# Patient Record
Sex: MALE | Race: WHITE | Employment: FULL TIME
[De-identification: names, ages, dates, MRNs, and addresses within clinical notes are randomized per-mention and may not be internally consistent; named-entity substitution may affect disease eponyms.]

---

## 2017-02-15 ENCOUNTER — OFFICE VISIT (OUTPATIENT)
Dept: FAMILY MEDICINE CLINIC | Facility: CLINIC | Age: 32
End: 2017-02-15

## 2017-02-15 VITALS
HEIGHT: 68 IN | DIASTOLIC BLOOD PRESSURE: 74 MMHG | RESPIRATION RATE: 18 BRPM | SYSTOLIC BLOOD PRESSURE: 122 MMHG | WEIGHT: 184.97 LBS | OXYGEN SATURATION: 100 % | TEMPERATURE: 98 F | HEART RATE: 76 BPM | BODY MASS INDEX: 28.03 KG/M2

## 2017-02-15 DIAGNOSIS — J02.9 SORE THROAT: ICD-10-CM

## 2017-02-15 DIAGNOSIS — J00 ACUTE NASOPHARYNGITIS (COMMON COLD): Primary | ICD-10-CM

## 2017-02-15 LAB — S PYO AG THROAT QL: NORMAL

## 2017-02-15 PROCEDURE — 87880 STREP A ASSAY W/OPTIC: CPT | Performed by: FAMILY MEDICINE

## 2017-02-15 PROCEDURE — G8427 DOCREV CUR MEDS BY ELIG CLIN: HCPCS | Performed by: FAMILY MEDICINE

## 2017-02-15 PROCEDURE — G8419 CALC BMI OUT NRM PARAM NOF/U: HCPCS | Performed by: FAMILY MEDICINE

## 2017-02-15 PROCEDURE — G8484 FLU IMMUNIZE NO ADMIN: HCPCS | Performed by: FAMILY MEDICINE

## 2017-02-15 PROCEDURE — 4004F PT TOBACCO SCREEN RCVD TLK: CPT | Performed by: FAMILY MEDICINE

## 2017-02-15 PROCEDURE — 99213 OFFICE O/P EST LOW 20 MIN: CPT | Performed by: FAMILY MEDICINE

## 2017-02-15 RX ORDER — FLUTICASONE PROPIONATE 50 MCG
2 SPRAY, SUSPENSION (ML) NASAL DAILY
Qty: 1 BOTTLE | Refills: 0 | Status: SHIPPED | OUTPATIENT
Start: 2017-02-15 | End: 2018-07-11 | Stop reason: ALTCHOICE

## 2017-02-15 ASSESSMENT — ENCOUNTER SYMPTOMS
WHEEZING: 0
COUGH: 0
EYE REDNESS: 0
RHINORRHEA: 1
SHORTNESS OF BREATH: 0
EYE DISCHARGE: 0
SORE THROAT: 1
CHEST TIGHTNESS: 0
SINUS PRESSURE: 0

## 2017-03-15 ENCOUNTER — OFFICE VISIT (OUTPATIENT)
Dept: FAMILY MEDICINE CLINIC | Age: 32
End: 2017-03-15
Payer: COMMERCIAL

## 2017-03-15 VITALS
RESPIRATION RATE: 18 BRPM | DIASTOLIC BLOOD PRESSURE: 75 MMHG | WEIGHT: 184.97 LBS | HEART RATE: 95 BPM | OXYGEN SATURATION: 97 % | TEMPERATURE: 96.4 F | HEIGHT: 68 IN | BODY MASS INDEX: 28.03 KG/M2 | SYSTOLIC BLOOD PRESSURE: 113 MMHG

## 2017-03-15 DIAGNOSIS — J02.9 PHARYNGITIS, UNSPECIFIED ETIOLOGY: ICD-10-CM

## 2017-03-15 DIAGNOSIS — J01.10 ACUTE NON-RECURRENT FRONTAL SINUSITIS: Primary | ICD-10-CM

## 2017-03-15 LAB — S PYO AG THROAT QL: NORMAL

## 2017-03-15 PROCEDURE — G8427 DOCREV CUR MEDS BY ELIG CLIN: HCPCS | Performed by: FAMILY MEDICINE

## 2017-03-15 PROCEDURE — 4004F PT TOBACCO SCREEN RCVD TLK: CPT | Performed by: FAMILY MEDICINE

## 2017-03-15 PROCEDURE — G8419 CALC BMI OUT NRM PARAM NOF/U: HCPCS | Performed by: FAMILY MEDICINE

## 2017-03-15 PROCEDURE — 87880 STREP A ASSAY W/OPTIC: CPT | Performed by: FAMILY MEDICINE

## 2017-03-15 PROCEDURE — G8484 FLU IMMUNIZE NO ADMIN: HCPCS | Performed by: FAMILY MEDICINE

## 2017-03-15 PROCEDURE — 99213 OFFICE O/P EST LOW 20 MIN: CPT | Performed by: FAMILY MEDICINE

## 2017-03-15 RX ORDER — GUAIFENESIN AND PSEUDOEPHEDRINE HCL 1200; 120 MG/1; MG/1
120 TABLET, EXTENDED RELEASE ORAL 2 TIMES DAILY
Qty: 20 TABLET | Refills: 0 | Status: SHIPPED | OUTPATIENT
Start: 2017-03-15 | End: 2017-03-25

## 2017-03-15 RX ORDER — FLUTICASONE PROPIONATE 50 MCG
1 SPRAY, SUSPENSION (ML) NASAL 2 TIMES DAILY
Qty: 1 BOTTLE | Refills: 2 | Status: SHIPPED | OUTPATIENT
Start: 2017-03-15 | End: 2018-07-11 | Stop reason: ALTCHOICE

## 2017-03-15 RX ORDER — AMOXICILLIN 875 MG/1
875 TABLET, COATED ORAL 2 TIMES DAILY
Qty: 20 TABLET | Refills: 0 | Status: SHIPPED | OUTPATIENT
Start: 2017-03-15 | End: 2017-03-25

## 2017-03-15 ASSESSMENT — ENCOUNTER SYMPTOMS
SORE THROAT: 1
RHINORRHEA: 1
COUGH: 1
EYES NEGATIVE: 1
SINUS PRESSURE: 1
ALLERGIC/IMMUNOLOGIC NEGATIVE: 1
GASTROINTESTINAL NEGATIVE: 1

## 2017-03-21 ENCOUNTER — OFFICE VISIT (OUTPATIENT)
Dept: FAMILY MEDICINE CLINIC | Age: 32
End: 2017-03-21
Payer: COMMERCIAL

## 2017-03-21 VITALS
TEMPERATURE: 98.1 F | HEART RATE: 74 BPM | RESPIRATION RATE: 18 BRPM | WEIGHT: 174 LBS | BODY MASS INDEX: 27.31 KG/M2 | OXYGEN SATURATION: 99 % | DIASTOLIC BLOOD PRESSURE: 74 MMHG | HEIGHT: 67 IN | SYSTOLIC BLOOD PRESSURE: 119 MMHG

## 2017-03-21 DIAGNOSIS — J02.9 PHARYNGITIS, UNSPECIFIED ETIOLOGY: ICD-10-CM

## 2017-03-21 DIAGNOSIS — H66.92 LEFT OTITIS MEDIA, UNSPECIFIED CHRONICITY, UNSPECIFIED OTITIS MEDIA TYPE: ICD-10-CM

## 2017-03-21 DIAGNOSIS — J01.90 ACUTE SINUSITIS, RECURRENCE NOT SPECIFIED, UNSPECIFIED LOCATION: Primary | ICD-10-CM

## 2017-03-21 PROCEDURE — G8419 CALC BMI OUT NRM PARAM NOF/U: HCPCS | Performed by: FAMILY MEDICINE

## 2017-03-21 PROCEDURE — 4004F PT TOBACCO SCREEN RCVD TLK: CPT | Performed by: FAMILY MEDICINE

## 2017-03-21 PROCEDURE — 99213 OFFICE O/P EST LOW 20 MIN: CPT | Performed by: FAMILY MEDICINE

## 2017-03-21 PROCEDURE — G8484 FLU IMMUNIZE NO ADMIN: HCPCS | Performed by: FAMILY MEDICINE

## 2017-03-21 PROCEDURE — G8427 DOCREV CUR MEDS BY ELIG CLIN: HCPCS | Performed by: FAMILY MEDICINE

## 2017-03-21 RX ORDER — PREDNISONE 20 MG/1
20 TABLET ORAL 2 TIMES DAILY
Qty: 6 TABLET | Refills: 0 | Status: SHIPPED | OUTPATIENT
Start: 2017-03-21 | End: 2017-03-24

## 2017-03-21 RX ORDER — SULFAMETHOXAZOLE AND TRIMETHOPRIM 800; 160 MG/1; MG/1
1 TABLET ORAL 2 TIMES DAILY
Qty: 20 TABLET | Refills: 0 | Status: SHIPPED | OUTPATIENT
Start: 2017-03-21 | End: 2017-03-31

## 2017-03-21 ASSESSMENT — ENCOUNTER SYMPTOMS
EYE ITCHING: 0
VOMITING: 0
SINUS PRESSURE: 1
EYE REDNESS: 0
SHORTNESS OF BREATH: 0
RHINORRHEA: 1
SWOLLEN GLANDS: 1
COUGH: 1
EYE DISCHARGE: 0
TROUBLE SWALLOWING: 0
WHEEZING: 0
CONSTIPATION: 0
ABDOMINAL PAIN: 0
VOICE CHANGE: 0
NAUSEA: 0
BACK PAIN: 0
DIARRHEA: 1
SORE THROAT: 1
CHEST TIGHTNESS: 0

## 2017-04-10 ENCOUNTER — OFFICE VISIT (OUTPATIENT)
Dept: FAMILY MEDICINE CLINIC | Age: 32
End: 2017-04-10
Payer: COMMERCIAL

## 2017-04-10 VITALS
HEIGHT: 67 IN | DIASTOLIC BLOOD PRESSURE: 76 MMHG | OXYGEN SATURATION: 100 % | RESPIRATION RATE: 18 BRPM | SYSTOLIC BLOOD PRESSURE: 121 MMHG | HEART RATE: 75 BPM | WEIGHT: 176 LBS | TEMPERATURE: 98 F | BODY MASS INDEX: 27.62 KG/M2

## 2017-04-10 DIAGNOSIS — M75.52 SHOULDER BURSITIS, LEFT: Primary | ICD-10-CM

## 2017-04-10 DIAGNOSIS — M25.512 ACUTE PAIN OF LEFT SHOULDER: ICD-10-CM

## 2017-04-10 PROCEDURE — 99213 OFFICE O/P EST LOW 20 MIN: CPT | Performed by: FAMILY MEDICINE

## 2017-04-10 PROCEDURE — 4004F PT TOBACCO SCREEN RCVD TLK: CPT | Performed by: FAMILY MEDICINE

## 2017-04-10 PROCEDURE — G8427 DOCREV CUR MEDS BY ELIG CLIN: HCPCS | Performed by: FAMILY MEDICINE

## 2017-04-10 PROCEDURE — G8419 CALC BMI OUT NRM PARAM NOF/U: HCPCS | Performed by: FAMILY MEDICINE

## 2017-04-10 RX ORDER — IBUPROFEN AND FAMOTIDINE 26.6; 8 MG/1; MG/1
1 TABLET, FILM COATED ORAL 3 TIMES DAILY
Qty: 90 TABLET | Refills: 1 | Status: SHIPPED | OUTPATIENT
Start: 2017-04-10 | End: 2017-08-13 | Stop reason: ALTCHOICE

## 2017-04-10 RX ORDER — CYCLOBENZAPRINE HCL 10 MG
10 TABLET ORAL 2 TIMES DAILY
Qty: 20 TABLET | Refills: 0 | Status: SHIPPED | OUTPATIENT
Start: 2017-04-10 | End: 2017-04-20

## 2017-04-10 ASSESSMENT — ENCOUNTER SYMPTOMS
GASTROINTESTINAL NEGATIVE: 1
ALLERGIC/IMMUNOLOGIC NEGATIVE: 1
EYES NEGATIVE: 1
BACK PAIN: 1
RESPIRATORY NEGATIVE: 1

## 2017-04-18 ENCOUNTER — OFFICE VISIT (OUTPATIENT)
Dept: FAMILY MEDICINE CLINIC | Age: 32
End: 2017-04-18
Payer: COMMERCIAL

## 2017-04-18 VITALS
HEART RATE: 90 BPM | TEMPERATURE: 98.3 F | RESPIRATION RATE: 18 BRPM | HEIGHT: 68 IN | SYSTOLIC BLOOD PRESSURE: 118 MMHG | WEIGHT: 173 LBS | DIASTOLIC BLOOD PRESSURE: 68 MMHG | BODY MASS INDEX: 26.22 KG/M2 | OXYGEN SATURATION: 100 %

## 2017-04-18 DIAGNOSIS — R06.2 WHEEZING: ICD-10-CM

## 2017-04-18 DIAGNOSIS — J20.9 ACUTE BRONCHITIS, UNSPECIFIED ORGANISM: Primary | ICD-10-CM

## 2017-04-18 DIAGNOSIS — J06.9 UPPER RESPIRATORY TRACT INFECTION, UNSPECIFIED TYPE: ICD-10-CM

## 2017-04-18 LAB — S PYO AG THROAT QL: NORMAL

## 2017-04-18 PROCEDURE — 87880 STREP A ASSAY W/OPTIC: CPT | Performed by: FAMILY MEDICINE

## 2017-04-18 PROCEDURE — 4004F PT TOBACCO SCREEN RCVD TLK: CPT | Performed by: FAMILY MEDICINE

## 2017-04-18 PROCEDURE — 94640 AIRWAY INHALATION TREATMENT: CPT | Performed by: FAMILY MEDICINE

## 2017-04-18 PROCEDURE — 99213 OFFICE O/P EST LOW 20 MIN: CPT | Performed by: FAMILY MEDICINE

## 2017-04-18 PROCEDURE — G8419 CALC BMI OUT NRM PARAM NOF/U: HCPCS | Performed by: FAMILY MEDICINE

## 2017-04-18 PROCEDURE — G8427 DOCREV CUR MEDS BY ELIG CLIN: HCPCS | Performed by: FAMILY MEDICINE

## 2017-04-18 RX ORDER — BENZONATATE 100 MG/1
100 CAPSULE ORAL 3 TIMES DAILY PRN
Qty: 25 CAPSULE | Refills: 0 | Status: SHIPPED | OUTPATIENT
Start: 2017-04-18 | End: 2017-05-13

## 2017-04-18 RX ORDER — AZITHROMYCIN 250 MG/1
250 TABLET, FILM COATED ORAL DAILY
Qty: 6 TABLET | Refills: 0 | Status: SHIPPED | OUTPATIENT
Start: 2017-04-18 | End: 2017-04-23

## 2017-04-18 RX ORDER — ALBUTEROL SULFATE 90 UG/1
2 AEROSOL, METERED RESPIRATORY (INHALATION) EVERY 6 HOURS PRN
Qty: 1 INHALER | Refills: 0 | Status: SHIPPED | OUTPATIENT
Start: 2017-04-18 | End: 2017-08-13 | Stop reason: ALTCHOICE

## 2017-04-18 RX ORDER — ALBUTEROL SULFATE 2.5 MG/3ML
2.5 SOLUTION RESPIRATORY (INHALATION) ONCE
Status: COMPLETED | OUTPATIENT
Start: 2017-04-18 | End: 2017-04-18

## 2017-04-18 RX ORDER — PREDNISONE 20 MG/1
20 TABLET ORAL 2 TIMES DAILY
Qty: 10 TABLET | Refills: 0 | Status: SHIPPED | OUTPATIENT
Start: 2017-04-18 | End: 2017-04-23

## 2017-04-18 RX ADMIN — ALBUTEROL SULFATE 2.5 MG: 2.5 SOLUTION RESPIRATORY (INHALATION) at 17:39

## 2017-04-18 ASSESSMENT — ENCOUNTER SYMPTOMS
SHORTNESS OF BREATH: 0
VOICE CHANGE: 0
EYE DISCHARGE: 0
WHEEZING: 1
RHINORRHEA: 1
EYE REDNESS: 0
BACK PAIN: 0
CONSTIPATION: 0
VOMITING: 0
EYE ITCHING: 0
NAUSEA: 0
SINUS PRESSURE: 0
CHEST TIGHTNESS: 0
TROUBLE SWALLOWING: 1
SORE THROAT: 1
DIARRHEA: 0
SWOLLEN GLANDS: 1
CHANGE IN BOWEL HABIT: 0
ABDOMINAL PAIN: 1
COUGH: 1

## 2017-04-19 ENCOUNTER — TELEPHONE (OUTPATIENT)
Dept: FAMILY MEDICINE CLINIC | Age: 32
End: 2017-04-19

## 2017-04-19 ENCOUNTER — HOSPITAL ENCOUNTER (OUTPATIENT)
Age: 32
Discharge: HOME | End: 2017-04-19
Payer: COMMERCIAL

## 2017-04-19 DIAGNOSIS — M54.6 MIDLINE THORACIC BACK PAIN, UNSPECIFIED CHRONICITY: ICD-10-CM

## 2017-04-19 DIAGNOSIS — J20.9 ACUTE BRONCHITIS, UNSPECIFIED ORGANISM: ICD-10-CM

## 2017-04-19 LAB
ABSOLUTE EOS #: 0 K/UL (ref 0–0.4)
ABSOLUTE LYMPH #: 1.8 K/UL (ref 1–4.8)
ABSOLUTE MONO #: 0.9 K/UL (ref 0.1–1.3)
ANION GAP SERPL CALCULATED.3IONS-SCNC: 13 MMOL/L (ref 9–17)
BASOPHILS # BLD: 0 % (ref 0–2)
BASOPHILS ABSOLUTE: 0 K/UL (ref 0–0.2)
BUN BLDV-MCNC: 12 MG/DL (ref 6–20)
BUN/CREAT BLD: ABNORMAL (ref 9–20)
CALCIUM SERPL-MCNC: 9.1 MG/DL (ref 8.6–10.4)
CHLORIDE BLD-SCNC: 101 MMOL/L (ref 98–107)
CO2: 26 MMOL/L (ref 20–31)
CREAT SERPL-MCNC: 0.69 MG/DL (ref 0.7–1.2)
DIFFERENTIAL TYPE: ABNORMAL
EOSINOPHILS RELATIVE PERCENT: 0 % (ref 0–4)
GFR AFRICAN AMERICAN: >60 ML/MIN
GFR NON-AFRICAN AMERICAN: >60 ML/MIN
GFR SERPL CREATININE-BSD FRML MDRD: ABNORMAL ML/MIN/{1.73_M2}
GFR SERPL CREATININE-BSD FRML MDRD: ABNORMAL ML/MIN/{1.73_M2}
GLUCOSE BLD-MCNC: 100 MG/DL (ref 70–99)
HCT VFR BLD CALC: 42 % (ref 41–53)
HEMOGLOBIN: 14.2 G/DL (ref 13.5–17.5)
LYMPHOCYTES # BLD: 16 % (ref 24–44)
MCH RBC QN AUTO: 29.6 PG (ref 26–34)
MCHC RBC AUTO-ENTMCNC: 33.8 G/DL (ref 31–37)
MCV RBC AUTO: 87.8 FL (ref 80–100)
MONOCYTES # BLD: 8 % (ref 1–7)
PDW BLD-RTO: 13.6 % (ref 11.5–14.9)
PLATELET # BLD: 196 K/UL (ref 150–450)
PLATELET ESTIMATE: ABNORMAL
PMV BLD AUTO: 9.1 FL (ref 6–12)
POTASSIUM SERPL-SCNC: 4.1 MMOL/L (ref 3.7–5.3)
RBC # BLD: 4.78 M/UL (ref 4.5–5.9)
RBC # BLD: ABNORMAL 10*6/UL
SEG NEUTROPHILS: 76 % (ref 36–66)
SEGMENTED NEUTROPHILS ABSOLUTE COUNT: 8.7 K/UL (ref 1.3–9.1)
SODIUM BLD-SCNC: 140 MMOL/L (ref 135–144)
WBC # BLD: 11.3 K/UL (ref 3.5–11)
WBC # BLD: ABNORMAL 10*3/UL

## 2017-04-19 PROCEDURE — 85025 COMPLETE CBC W/AUTO DIFF WBC: CPT

## 2017-04-19 PROCEDURE — 36415 COLL VENOUS BLD VENIPUNCTURE: CPT

## 2017-04-19 PROCEDURE — 80048 BASIC METABOLIC PNL TOTAL CA: CPT

## 2017-08-13 ENCOUNTER — HOSPITAL ENCOUNTER (EMERGENCY)
Age: 32
Discharge: HOME | End: 2017-08-13
Attending: EMERGENCY MEDICINE
Payer: COMMERCIAL

## 2017-08-13 ENCOUNTER — OFFICE VISIT (OUTPATIENT)
Dept: FAMILY MEDICINE CLINIC | Age: 32
End: 2017-08-13
Payer: COMMERCIAL

## 2017-08-13 VITALS
BODY MASS INDEX: 27.28 KG/M2 | HEIGHT: 68 IN | WEIGHT: 180 LBS | SYSTOLIC BLOOD PRESSURE: 124 MMHG | DIASTOLIC BLOOD PRESSURE: 68 MMHG | RESPIRATION RATE: 18 BRPM | TEMPERATURE: 98.2 F | HEART RATE: 78 BPM | OXYGEN SATURATION: 97 %

## 2017-08-13 VITALS
WEIGHT: 176 LBS | DIASTOLIC BLOOD PRESSURE: 69 MMHG | BODY MASS INDEX: 27.62 KG/M2 | RESPIRATION RATE: 18 BRPM | OXYGEN SATURATION: 97 % | HEIGHT: 67 IN | SYSTOLIC BLOOD PRESSURE: 111 MMHG | HEART RATE: 82 BPM | TEMPERATURE: 98.2 F

## 2017-08-13 DIAGNOSIS — R07.81 PLEURITIC CHEST PAIN: Primary | ICD-10-CM

## 2017-08-13 DIAGNOSIS — R07.9 CHEST PAIN, UNSPECIFIED TYPE: Primary | ICD-10-CM

## 2017-08-13 LAB — D-DIMER QUANTITATIVE: <0.19 MG/L FEU

## 2017-08-13 PROCEDURE — G8419 CALC BMI OUT NRM PARAM NOF/U: HCPCS | Performed by: INTERNAL MEDICINE

## 2017-08-13 PROCEDURE — 93005 ELECTROCARDIOGRAM TRACING: CPT

## 2017-08-13 PROCEDURE — 36415 COLL VENOUS BLD VENIPUNCTURE: CPT

## 2017-08-13 PROCEDURE — 85379 FIBRIN DEGRADATION QUANT: CPT

## 2017-08-13 PROCEDURE — G8427 DOCREV CUR MEDS BY ELIG CLIN: HCPCS | Performed by: INTERNAL MEDICINE

## 2017-08-13 PROCEDURE — 4004F PT TOBACCO SCREEN RCVD TLK: CPT | Performed by: INTERNAL MEDICINE

## 2017-08-13 PROCEDURE — 99285 EMERGENCY DEPT VISIT HI MDM: CPT

## 2017-08-13 PROCEDURE — 99213 OFFICE O/P EST LOW 20 MIN: CPT | Performed by: INTERNAL MEDICINE

## 2017-08-13 RX ORDER — NAPROXEN 500 MG/1
500 TABLET ORAL 2 TIMES DAILY
Qty: 20 TABLET | Refills: 0 | Status: SHIPPED | OUTPATIENT
Start: 2017-08-13 | End: 2017-12-20 | Stop reason: ALTCHOICE

## 2017-08-13 ASSESSMENT — ENCOUNTER SYMPTOMS
COUGH: 0
COUGH: 0
VOMITING: 0
SHORTNESS OF BREATH: 0
NAUSEA: 0
HEARTBURN: 0
SHORTNESS OF BREATH: 0

## 2017-08-13 ASSESSMENT — PAIN DESCRIPTION - PAIN TYPE: TYPE: ACUTE PAIN

## 2017-08-13 ASSESSMENT — PAIN SCALES - GENERAL: PAINLEVEL_OUTOF10: 2

## 2017-08-13 ASSESSMENT — PAIN DESCRIPTION - DIRECTION: RADIATING_TOWARDS: BACK

## 2017-08-13 ASSESSMENT — PAIN DESCRIPTION - LOCATION: LOCATION: CHEST

## 2017-08-23 LAB
EKG ATRIAL RATE: 72 BPM
EKG P AXIS: 7 DEGREES
EKG P-R INTERVAL: 128 MS
EKG Q-T INTERVAL: 376 MS
EKG QRS DURATION: 92 MS
EKG QTC CALCULATION (BAZETT): 411 MS
EKG R AXIS: 63 DEGREES
EKG T AXIS: 42 DEGREES
EKG VENTRICULAR RATE: 72 BPM

## 2017-12-20 ENCOUNTER — OFFICE VISIT (OUTPATIENT)
Dept: FAMILY MEDICINE CLINIC | Age: 32
End: 2017-12-20
Payer: COMMERCIAL

## 2017-12-20 VITALS
HEIGHT: 67 IN | BODY MASS INDEX: 29.32 KG/M2 | WEIGHT: 186.8 LBS | RESPIRATION RATE: 16 BRPM | TEMPERATURE: 98.4 F | OXYGEN SATURATION: 99 % | HEART RATE: 76 BPM | DIASTOLIC BLOOD PRESSURE: 71 MMHG | SYSTOLIC BLOOD PRESSURE: 106 MMHG

## 2017-12-20 DIAGNOSIS — J06.0 SORE THROAT AND LARYNGITIS: Primary | ICD-10-CM

## 2017-12-20 LAB — S PYO AG THROAT QL: NORMAL

## 2017-12-20 PROCEDURE — 87880 STREP A ASSAY W/OPTIC: CPT | Performed by: FAMILY MEDICINE

## 2017-12-20 PROCEDURE — G8427 DOCREV CUR MEDS BY ELIG CLIN: HCPCS | Performed by: FAMILY MEDICINE

## 2017-12-20 PROCEDURE — G8484 FLU IMMUNIZE NO ADMIN: HCPCS | Performed by: FAMILY MEDICINE

## 2017-12-20 PROCEDURE — 99214 OFFICE O/P EST MOD 30 MIN: CPT | Performed by: FAMILY MEDICINE

## 2017-12-20 PROCEDURE — 4004F PT TOBACCO SCREEN RCVD TLK: CPT | Performed by: FAMILY MEDICINE

## 2017-12-20 PROCEDURE — G8419 CALC BMI OUT NRM PARAM NOF/U: HCPCS | Performed by: FAMILY MEDICINE

## 2017-12-20 RX ORDER — FLUTICASONE PROPIONATE 50 MCG
1 SPRAY, SUSPENSION (ML) NASAL 2 TIMES DAILY
Qty: 1 BOTTLE | Refills: 2 | Status: SHIPPED | OUTPATIENT
Start: 2017-12-20 | End: 2018-07-11 | Stop reason: ALTCHOICE

## 2017-12-20 RX ORDER — GUAIFENESIN 600 MG/1
600 TABLET, EXTENDED RELEASE ORAL 2 TIMES DAILY
Qty: 20 TABLET | Refills: 0 | Status: SHIPPED | OUTPATIENT
Start: 2017-12-20 | End: 2017-12-30

## 2017-12-20 RX ORDER — AMOXICILLIN 875 MG/1
875 TABLET, COATED ORAL 2 TIMES DAILY
Qty: 20 TABLET | Refills: 0 | Status: SHIPPED | OUTPATIENT
Start: 2017-12-20 | End: 2017-12-30

## 2017-12-20 RX ORDER — LAMOTRIGINE 300 MG/1
300 TABLET, EXTENDED RELEASE ORAL ONCE
COMMUNITY
End: 2022-07-11

## 2017-12-20 ASSESSMENT — ENCOUNTER SYMPTOMS
SINUS PAIN: 1
SINUS PRESSURE: 1
EYES NEGATIVE: 1
RHINORRHEA: 1
GASTROINTESTINAL NEGATIVE: 1
SORE THROAT: 1
COUGH: 1
ALLERGIC/IMMUNOLOGIC NEGATIVE: 1

## 2017-12-20 NOTE — PROGRESS NOTES
Dispense Refill    lamoTRIgine  MG TB24 Take 300 mg by mouth once      amoxicillin (AMOXIL) 875 MG tablet Take 1 tablet by mouth 2 times daily for 10 days 20 tablet 0    guaiFENesin (MUCINEX) 600 MG extended release tablet Take 1 tablet by mouth 2 times daily for 10 days 20 tablet 0    fluticasone (FLONASE) 50 MCG/ACT nasal spray 1 spray by Nasal route 2 times daily for 14 days 1 Bottle 2    venlafaxine (EFFEXOR XR) 150 MG extended release capsule Take 150 mg by mouth daily      fluticasone (FLONASE) 50 MCG/ACT nasal spray 1 spray by Nasal route 2 times daily for 14 days 1 Bottle 2    fluticasone (FLONASE) 50 MCG/ACT nasal spray 2 sprays by Nasal route daily for 14 days 1 Bottle 0     No current facility-administered medications for this visit. No Known Allergies    Health Maintenance   Topic Date Due    HIV screen  09/01/2002    DTaP/Tdap/Td vaccine (1 - Tdap) 09/01/2006    Pneumococcal med risk (1 of 1 - PPSV23) 09/01/2006    Flu vaccine (1) 09/01/2017       Subjective:      Review of Systems   Constitutional: Negative. Negative for fatigue and fever. HENT: Positive for congestion, postnasal drip, rhinorrhea, sinus pain, sinus pressure and sore throat. Eyes: Negative. Respiratory: Positive for cough. Cardiovascular: Negative. Gastrointestinal: Negative. Endocrine: Negative. Genitourinary: Negative. Musculoskeletal: Negative. Skin: Negative. Allergic/Immunologic: Negative. Neurological: Negative. Hematological: Negative. Psychiatric/Behavioral: Negative. Objective:     Physical Exam   Constitutional: He is oriented to person, place, and time. He appears well-developed and well-nourished. HENT:   Head: Normocephalic and atraumatic. Right Ear: External ear normal.   Left Ear: External ear normal.   Nose: Mucosal edema and rhinorrhea present. Right sinus exhibits maxillary sinus tenderness. Left sinus exhibits maxillary sinus tenderness. answered. Pt voiced understanding. Reviewed health maintenance. Instructed to continue current medications, diet and exercise. Patient agreed with treatment plan. Follow up as directed.      Electronically signed by Gavin Gonzales MD on 12/20/2017 at 12:18 PM

## 2018-03-23 ENCOUNTER — OFFICE VISIT (OUTPATIENT)
Dept: FAMILY MEDICINE CLINIC | Age: 33
End: 2018-03-23
Payer: COMMERCIAL

## 2018-03-23 VITALS
DIASTOLIC BLOOD PRESSURE: 65 MMHG | OXYGEN SATURATION: 98 % | WEIGHT: 185 LBS | BODY MASS INDEX: 29.03 KG/M2 | SYSTOLIC BLOOD PRESSURE: 105 MMHG | HEART RATE: 73 BPM | TEMPERATURE: 97.8 F | HEIGHT: 67 IN

## 2018-03-23 DIAGNOSIS — M77.8 LEFT HAND TENDONITIS: Primary | ICD-10-CM

## 2018-03-23 DIAGNOSIS — Q74.0: ICD-10-CM

## 2018-03-23 PROCEDURE — 99214 OFFICE O/P EST MOD 30 MIN: CPT | Performed by: FAMILY MEDICINE

## 2018-03-23 PROCEDURE — G8419 CALC BMI OUT NRM PARAM NOF/U: HCPCS | Performed by: FAMILY MEDICINE

## 2018-03-23 PROCEDURE — 4004F PT TOBACCO SCREEN RCVD TLK: CPT | Performed by: FAMILY MEDICINE

## 2018-03-23 PROCEDURE — G8484 FLU IMMUNIZE NO ADMIN: HCPCS | Performed by: FAMILY MEDICINE

## 2018-03-23 PROCEDURE — G8427 DOCREV CUR MEDS BY ELIG CLIN: HCPCS | Performed by: FAMILY MEDICINE

## 2018-03-23 RX ORDER — METHYLPREDNISOLONE 4 MG/1
TABLET ORAL
Qty: 1 KIT | Refills: 0 | Status: SHIPPED | OUTPATIENT
Start: 2018-03-23 | End: 2018-07-11 | Stop reason: ALTCHOICE

## 2018-03-23 RX ORDER — IBUPROFEN 800 MG/1
800 TABLET ORAL EVERY 6 HOURS PRN
Qty: 90 TABLET | Refills: 3 | Status: SHIPPED | OUTPATIENT
Start: 2018-03-23 | End: 2018-10-09

## 2018-03-23 RX ORDER — CYCLOBENZAPRINE HCL 10 MG
10 TABLET ORAL 2 TIMES DAILY
Qty: 20 TABLET | Refills: 0 | Status: SHIPPED | OUTPATIENT
Start: 2018-03-23 | End: 2018-04-02

## 2018-03-23 ASSESSMENT — ENCOUNTER SYMPTOMS
EYES NEGATIVE: 1
ALLERGIC/IMMUNOLOGIC NEGATIVE: 1
RESPIRATORY NEGATIVE: 1
GASTROINTESTINAL NEGATIVE: 1

## 2018-03-23 NOTE — PROGRESS NOTES
Current Every Day Smoker     Packs/day: 0.50     Types: Cigarettes    Smokeless tobacco: Never Used    Alcohol use No      Current Outpatient Prescriptions   Medication Sig Dispense Refill    methylPREDNISolone (MEDROL, AMADEO,) 4 MG tablet By mouth. 1 kit 0    ibuprofen (ADVIL;MOTRIN) 800 MG tablet Take 1 tablet by mouth every 6 hours as needed for Pain 90 tablet 3    cyclobenzaprine (FLEXERIL) 10 MG tablet Take 1 tablet by mouth 2 times daily for 10 days 20 tablet 0    lamoTRIgine  MG TB24 Take 300 mg by mouth once      venlafaxine (EFFEXOR XR) 150 MG extended release capsule Take 150 mg by mouth daily      fluticasone (FLONASE) 50 MCG/ACT nasal spray 1 spray by Nasal route 2 times daily for 14 days 1 Bottle 2    fluticasone (FLONASE) 50 MCG/ACT nasal spray 1 spray by Nasal route 2 times daily for 14 days 1 Bottle 2    fluticasone (FLONASE) 50 MCG/ACT nasal spray 2 sprays by Nasal route daily for 14 days 1 Bottle 0     No current facility-administered medications for this visit. No Known Allergies    Health Maintenance   Topic Date Due    HIV screen  09/01/2002    DTaP/Tdap/Td vaccine (1 - Tdap) 09/01/2006    Pneumococcal med risk (1 of 1 - PPSV23) 09/01/2006    Flu vaccine (1) 09/01/2017       Subjective:      Review of Systems   Constitutional: Negative. HENT: Negative. Eyes: Negative. Respiratory: Negative. Cardiovascular: Negative. Gastrointestinal: Negative. Endocrine: Negative. Genitourinary: Negative. Musculoskeletal: Negative. Left hand pain. Skin: Negative. Allergic/Immunologic: Negative. Neurological: Negative. Hematological: Negative. Psychiatric/Behavioral: Negative. Objective:     Physical Exam   Constitutional: He is oriented to person, place, and time. He appears well-developed and well-nourished. HENT:   Head: Normocephalic and atraumatic.    Right Ear: External ear normal.   Left Ear: External ear normal.   Nose: Nose normal.   Mouth/Throat: Oropharynx is clear and moist.   Eyes: Conjunctivae and EOM are normal. Pupils are equal, round, and reactive to light. Neck: Normal range of motion. Neck supple. Cardiovascular: Normal rate, regular rhythm, normal heart sounds and intact distal pulses. Pulmonary/Chest: Effort normal.   Abdominal: Soft. Bowel sounds are normal.   Musculoskeletal:        Left hand: He exhibits decreased range of motion, tenderness and bony tenderness. Hands:  Neurological: He is alert and oriented to person, place, and time. He has normal strength and normal reflexes. He displays a negative Romberg sign. Skin: Skin is warm and dry. Psychiatric: He has a normal mood and affect. His behavior is normal. Judgment and thought content normal.   Nursing note and vitals reviewed. /65   Pulse 73   Temp 97.8 °F (36.6 °C) (Tympanic)   Ht 5' 7.01\" (1.702 m)   Wt 185 lb (83.9 kg)   SpO2 98%   BMI 28.97 kg/m²     Assessment:      1. Left hand tendonitis  methylPREDNISolone (MEDROL, AMADEO,) 4 MG tablet    ibuprofen (ADVIL;MOTRIN) 800 MG tablet    cyclobenzaprine (FLEXERIL) 10 MG tablet   2. Small thenar eminence  methylPREDNISolone (MEDROL, AMADEO,) 4 MG tablet    ibuprofen (ADVIL;MOTRIN) 800 MG tablet    cyclobenzaprine (FLEXERIL) 10 MG tablet             Plan:      Return if symptoms worsen or fail to improve. No overexertion's no forced movements. Be careful of repetitive movements and gripping items. No orders of the defined types were placed in this encounter. Orders Placed This Encounter   Medications    methylPREDNISolone (MEDROL, AMADEO,) 4 MG tablet     Sig: By mouth.      Dispense:  1 kit     Refill:  0    ibuprofen (ADVIL;MOTRIN) 800 MG tablet     Sig: Take 1 tablet by mouth every 6 hours as needed for Pain     Dispense:  90 tablet     Refill:  3    cyclobenzaprine (FLEXERIL) 10 MG tablet     Sig: Take 1 tablet by mouth 2 times daily for 10 days     Dispense:  20 tablet     Refill: 0       Patient given educational materials - see patient instructions. Discussed use, benefit, and side effects of prescribed medications. All patient questions answered. Pt voiced understanding. Reviewed health maintenance. Instructed to continue current medications, diet and exercise. Patient agreed with treatment plan. Follow up as directed.      Electronically signed by Danielle Teixeira MD on 3/23/2018 at 12:39 PM

## 2018-07-11 ENCOUNTER — OFFICE VISIT (OUTPATIENT)
Dept: FAMILY MEDICINE CLINIC | Age: 33
End: 2018-07-11
Payer: COMMERCIAL

## 2018-07-11 ENCOUNTER — HOSPITAL ENCOUNTER (OUTPATIENT)
Dept: GENERAL RADIOLOGY | Age: 33
Discharge: HOME | End: 2018-07-13
Payer: COMMERCIAL

## 2018-07-11 ENCOUNTER — HOSPITAL ENCOUNTER (OUTPATIENT)
Age: 33
Discharge: HOME | End: 2018-07-13
Payer: COMMERCIAL

## 2018-07-11 VITALS
HEIGHT: 67 IN | DIASTOLIC BLOOD PRESSURE: 72 MMHG | BODY MASS INDEX: 29.03 KG/M2 | TEMPERATURE: 97.8 F | OXYGEN SATURATION: 99 % | HEART RATE: 72 BPM | WEIGHT: 185 LBS | SYSTOLIC BLOOD PRESSURE: 116 MMHG

## 2018-07-11 DIAGNOSIS — R06.02 SHORTNESS OF BREATH: Primary | ICD-10-CM

## 2018-07-11 DIAGNOSIS — R06.02 SHORTNESS OF BREATH: ICD-10-CM

## 2018-07-11 PROCEDURE — 4004F PT TOBACCO SCREEN RCVD TLK: CPT | Performed by: FAMILY MEDICINE

## 2018-07-11 PROCEDURE — G8427 DOCREV CUR MEDS BY ELIG CLIN: HCPCS | Performed by: FAMILY MEDICINE

## 2018-07-11 PROCEDURE — 99214 OFFICE O/P EST MOD 30 MIN: CPT | Performed by: FAMILY MEDICINE

## 2018-07-11 PROCEDURE — G8419 CALC BMI OUT NRM PARAM NOF/U: HCPCS | Performed by: FAMILY MEDICINE

## 2018-07-11 PROCEDURE — 71046 X-RAY EXAM CHEST 2 VIEWS: CPT

## 2018-07-11 RX ORDER — ALBUTEROL SULFATE 90 UG/1
2 AEROSOL, METERED RESPIRATORY (INHALATION) EVERY 6 HOURS PRN
Qty: 1 INHALER | Refills: 3 | Status: SHIPPED | OUTPATIENT
Start: 2018-07-11 | End: 2018-10-09

## 2018-07-11 ASSESSMENT — ENCOUNTER SYMPTOMS
STRIDOR: 0
SPUTUM PRODUCTION: 0
SHORTNESS OF BREATH: 1
GASTROINTESTINAL NEGATIVE: 1
ALLERGIC/IMMUNOLOGIC NEGATIVE: 1
COUGH: 0
EYES NEGATIVE: 1
RHINORRHEA: 0
WHEEZING: 0
SWOLLEN GLANDS: 0

## 2018-07-11 ASSESSMENT — PATIENT HEALTH QUESTIONNAIRE - PHQ9
SUM OF ALL RESPONSES TO PHQ QUESTIONS 1-9: 0
1. LITTLE INTEREST OR PLEASURE IN DOING THINGS: 0
2. FEELING DOWN, DEPRESSED OR HOPELESS: 0
SUM OF ALL RESPONSES TO PHQ9 QUESTIONS 1 & 2: 0

## 2018-07-11 NOTE — PROGRESS NOTES
[address] [facility]   [address] [address]  Dept: [phone]  Dept Fax: [phone]    Carter Daniels is a 29 y.o. male who presents today for his medical conditions/complaints as noted below. Carter Daniels is c/o of   Chief Complaint   Patient presents with    Shortness of Breath         HPI:     This patient is a 30-year-old white male with a past medical history significant for anxiety, depression, hyperlipidemia, and panic attacks. Today for a sudden onset of shortness of breath. Patient is in no apparent distress he is alert and oriented. He denies fevers chills or any upper respiratory tract infections. We will evaluate and treat. Shortness of Breath   This is a new problem. The current episode started yesterday. The problem occurs constantly. The problem has been gradually worsening. Pertinent negatives include no leg swelling, neck pain, rhinorrhea, sputum production, swollen glands, syncope or wheezing. The symptoms are aggravated by weather changes. He has tried nothing for the symptoms.        Hemoglobin A1C (%)   Date Value   12/24/2015 5.2             ( goal A1C is < 7)   No results found for: LABMICR  LDL Cholesterol (mg/dL)   Date Value   12/24/2015 141 (H)       (goal LDL is <100)   AST (U/L)   Date Value   12/24/2015 15     ALT (U/L)   Date Value   12/24/2015 16     BUN (mg/dL)   Date Value   04/19/2017 12     BP Readings from Last 3 Encounters:   07/11/18 116/72   03/23/18 105/65   12/20/17 106/71          (goal 120/80)    Past Medical History:   Diagnosis Date    Anxiety     Depression     Hyperlipidemia     Panic attacks       Past Surgical History:   Procedure Laterality Date    EXPLORATION OF UNDESCENDED TESTICLE Right     TONSILLECTOMY         Family History   Problem Relation Age of Onset    No Known Problems Mother     No Known Problems Father     No Known Problems Sister        Social History Substance Use Topics    Smoking status: Current Every Day Smoker     Packs/day: 0.50     Types: Cigarettes    Smokeless tobacco: Never Used    Alcohol use No      Current Outpatient Prescriptions   Medication Sig Dispense Refill    albuterol sulfate HFA (VENTOLIN HFA) 108 (90 Base) MCG/ACT inhaler Inhale 2 puffs into the lungs every 6 hours as needed for Wheezing 1 Inhaler 3    lamoTRIgine  MG TB24 Take 300 mg by mouth once      venlafaxine (EFFEXOR XR) 150 MG extended release capsule Take 150 mg by mouth daily      ibuprofen (ADVIL;MOTRIN) 800 MG tablet Take 1 tablet by mouth every 6 hours as needed for Pain 90 tablet 3     No current facility-administered medications for this visit. No Known Allergies    Health Maintenance   Topic Date Due    HIV screen  09/01/2002    DTaP/Tdap/Td vaccine (1 - Tdap) 09/01/2006    Pneumococcal med risk (1 of 1 - PPSV23) 09/01/2006    Flu vaccine (1) 09/01/2018       Subjective:      Review of Systems   Constitutional: Negative. HENT: Negative. Negative for rhinorrhea. Eyes: Negative. Respiratory: Positive for shortness of breath. Negative for cough, sputum production, wheezing and stridor. Cardiovascular: Negative. Negative for leg swelling and syncope. Gastrointestinal: Negative. Endocrine: Negative. Genitourinary: Negative. Musculoskeletal: Negative. Negative for neck pain. Skin: Negative. Allergic/Immunologic: Negative. Neurological: Negative. Hematological: Negative. Psychiatric/Behavioral: Negative. Objective:     Physical Exam   Constitutional: He is oriented to person, place, and time. He appears well-developed and well-nourished. HENT:   Head: Normocephalic and atraumatic. Right Ear: External ear normal.   Left Ear: External ear normal.   Nose: Nose normal.   Mouth/Throat: Oropharynx is clear and moist.   Eyes: Conjunctivae and EOM are normal. Pupils are equal, round, and reactive to light.    Neck: 7/11/2018 at 12:34 PM

## 2018-10-09 PROBLEM — F41.1 GAD (GENERALIZED ANXIETY DISORDER): Status: ACTIVE | Noted: 2018-10-09

## 2018-10-09 PROBLEM — E78.5 HYPERLIPIDEMIA: Status: ACTIVE | Noted: 2018-10-09

## 2018-10-10 ENCOUNTER — HOSPITAL ENCOUNTER (OUTPATIENT)
Dept: GENERAL RADIOLOGY | Facility: CLINIC | Age: 33
Discharge: HOME | End: 2018-10-12
Payer: COMMERCIAL

## 2018-10-10 ENCOUNTER — HOSPITAL ENCOUNTER (OUTPATIENT)
Age: 33
Setting detail: SPECIMEN
Discharge: HOME | End: 2018-10-10
Payer: COMMERCIAL

## 2018-10-10 ENCOUNTER — HOSPITAL ENCOUNTER (OUTPATIENT)
Facility: CLINIC | Age: 33
Discharge: HOME | End: 2018-10-12
Payer: COMMERCIAL

## 2018-10-10 DIAGNOSIS — F41.1 GAD (GENERALIZED ANXIETY DISORDER): ICD-10-CM

## 2018-10-10 DIAGNOSIS — E78.5 HYPERLIPIDEMIA, UNSPECIFIED HYPERLIPIDEMIA TYPE: ICD-10-CM

## 2018-10-10 DIAGNOSIS — G89.29 CHRONIC MIDLINE THORACIC BACK PAIN: ICD-10-CM

## 2018-10-10 DIAGNOSIS — M54.6 CHRONIC MIDLINE THORACIC BACK PAIN: ICD-10-CM

## 2018-10-10 PROBLEM — M41.80 DEXTROSCOLIOSIS: Status: ACTIVE | Noted: 2018-10-10

## 2018-10-10 LAB
ABSOLUTE EOS #: 0.12 K/UL (ref 0–0.44)
ABSOLUTE IMMATURE GRANULOCYTE: <0.03 K/UL (ref 0–0.3)
ABSOLUTE LYMPH #: 1.97 K/UL (ref 1.1–3.7)
ABSOLUTE MONO #: 0.44 K/UL (ref 0.1–1.2)
ALBUMIN SERPL-MCNC: 4.1 G/DL (ref 3.5–5.2)
ALBUMIN/GLOBULIN RATIO: 1.6 (ref 1–2.5)
ALP BLD-CCNC: 98 U/L (ref 40–129)
ALT SERPL-CCNC: 17 U/L (ref 5–41)
ANION GAP SERPL CALCULATED.3IONS-SCNC: 11 MMOL/L (ref 9–17)
AST SERPL-CCNC: 15 U/L
BASOPHILS # BLD: 1 % (ref 0–2)
BASOPHILS ABSOLUTE: 0.05 K/UL (ref 0–0.2)
BILIRUB SERPL-MCNC: 0.64 MG/DL (ref 0.3–1.2)
BUN BLDV-MCNC: 13 MG/DL (ref 6–20)
BUN/CREAT BLD: ABNORMAL (ref 9–20)
CALCIUM SERPL-MCNC: 9.1 MG/DL (ref 8.6–10.4)
CHLORIDE BLD-SCNC: 109 MMOL/L (ref 98–107)
CHOLESTEROL/HDL RATIO: 6.5
CHOLESTEROL: 215 MG/DL
CO2: 24 MMOL/L (ref 20–31)
CREAT SERPL-MCNC: 0.78 MG/DL (ref 0.7–1.2)
DIFFERENTIAL TYPE: NORMAL
EOSINOPHILS RELATIVE PERCENT: 2 % (ref 1–4)
GFR AFRICAN AMERICAN: >60 ML/MIN
GFR NON-AFRICAN AMERICAN: >60 ML/MIN
GFR SERPL CREATININE-BSD FRML MDRD: ABNORMAL ML/MIN/{1.73_M2}
GFR SERPL CREATININE-BSD FRML MDRD: ABNORMAL ML/MIN/{1.73_M2}
GLUCOSE BLD-MCNC: 91 MG/DL (ref 70–99)
HCT VFR BLD CALC: 44.1 % (ref 40.7–50.3)
HDLC SERPL-MCNC: 33 MG/DL
HEMOGLOBIN: 14.4 G/DL (ref 13–17)
IMMATURE GRANULOCYTES: 0 %
LDL CHOLESTEROL: 145 MG/DL (ref 0–130)
LYMPHOCYTES # BLD: 32 % (ref 24–43)
MCH RBC QN AUTO: 29.6 PG (ref 25.2–33.5)
MCHC RBC AUTO-ENTMCNC: 32.7 G/DL (ref 28.4–34.8)
MCV RBC AUTO: 90.6 FL (ref 82.6–102.9)
MONOCYTES # BLD: 7 % (ref 3–12)
NRBC AUTOMATED: 0 PER 100 WBC
PDW BLD-RTO: 12.9 % (ref 11.8–14.4)
PLATELET # BLD: 215 K/UL (ref 138–453)
PLATELET ESTIMATE: NORMAL
PMV BLD AUTO: 10.9 FL (ref 8.1–13.5)
POTASSIUM SERPL-SCNC: 5.2 MMOL/L (ref 3.7–5.3)
RBC # BLD: 4.87 M/UL (ref 4.21–5.77)
RBC # BLD: NORMAL 10*6/UL
SEG NEUTROPHILS: 58 % (ref 36–65)
SEGMENTED NEUTROPHILS ABSOLUTE COUNT: 3.59 K/UL (ref 1.5–8.1)
SODIUM BLD-SCNC: 144 MMOL/L (ref 135–144)
TOTAL PROTEIN: 6.7 G/DL (ref 6.4–8.3)
TRIGL SERPL-MCNC: 183 MG/DL
TSH SERPL DL<=0.05 MIU/L-ACNC: 1.4 MIU/L (ref 0.3–5)
VITAMIN D 25-HYDROXY: 29.4 NG/ML (ref 30–100)
VLDLC SERPL CALC-MCNC: ABNORMAL MG/DL (ref 1–30)
WBC # BLD: 6.2 K/UL (ref 3.5–11.3)
WBC # BLD: NORMAL 10*3/UL

## 2018-10-10 PROCEDURE — 72072 X-RAY EXAM THORAC SPINE 3VWS: CPT

## 2018-10-18 ENCOUNTER — HOSPITAL ENCOUNTER (OUTPATIENT)
Dept: PHYSICAL THERAPY | Age: 33
Setting detail: THERAPIES SERIES
Discharge: HOME | End: 2018-10-18
Payer: COMMERCIAL

## 2018-10-18 PROCEDURE — 97161 PT EVAL LOW COMPLEX 20 MIN: CPT

## 2018-10-18 ASSESSMENT — PAIN DESCRIPTION - LOCATION: LOCATION: BACK

## 2018-10-18 ASSESSMENT — PAIN DESCRIPTION - DESCRIPTORS: DESCRIPTORS: ACHING;BURNING;SHARP;CONSTANT

## 2018-10-18 ASSESSMENT — PAIN DESCRIPTION - PAIN TYPE: TYPE: CHRONIC PAIN

## 2018-10-18 ASSESSMENT — PAIN DESCRIPTION - FREQUENCY: FREQUENCY: CONTINUOUS

## 2018-10-18 ASSESSMENT — PAIN DESCRIPTION - ONSET: ONSET: GRADUAL

## 2018-10-18 ASSESSMENT — PAIN DESCRIPTION - PROGRESSION: CLINICAL_PROGRESSION: GRADUALLY WORSENING

## 2018-10-18 ASSESSMENT — PAIN SCALES - GENERAL: PAINLEVEL_OUTOF10: 6

## 2018-10-18 ASSESSMENT — PAIN DESCRIPTION - ORIENTATION: ORIENTATION: MID

## 2018-10-19 NOTE — PROGRESS NOTES
Physical Therapy  Initial Assessment  Date: 10/18/2018  Patient Name: Sybil Shafer  MRN: [id number]  : 1985     Treatment Diagnosis: Scapulothoracic Weakness    Restrictions  Position Activity Restriction  Other position/activity restrictions: No activity restrictions per physician's order. General  Chart Reviewed: Yes  Patient assessed for rehabilitation services?: Yes  Response To Previous Treatment: Not applicable  Family / Caregiver Present: No  Referring Practitioner: Gisela AARON  Referral Date : 10/10/18  Diagnosis: Dextroscoliosis (M41.80)   Follows Commands: Within Functional Limits  General Comment  Comments: History of mid back pain/ongoing for 2-3 years. Gradual onset. Cause unknown. Slight improvements have been seen in the past. However, the pain has never completely subsided. Progressively has got worse over the last several months. Saw PCP- thoracic X-ray taken, pain medication provided and outpt PT ordered. PT Visit Information  Onset Date: 10/10/18  PT Insurance Information: [facility]   Total # of Visits Approved: 12  Total # of Visits to Date: 1  No Show: 0  Canceled Appointment: 0    Subjective  Pain Screening  Patient Currently in Pain: Yes  Pain Assessment  Pain Assessment: 0-10  Pain Level: 6  Pain Type: Chronic pain  Pain Location: Back  Pain Orientation: Mid  Pain Descriptors: Aching;Burning; Anjana All; Constant  Pain Frequency: Continuous  Pain Onset: Gradual  Clinical Progression: Gradually worsening  Effect of Pain on Daily Activities: Difficulty sleeping - trouble falling asleep/pain does not wake him at night. Sitting, standing/ambulation, lifting/carrying and OH reaching. Pain gets worse as the day progresses. Patient's Stated Pain Goal: 2  Pain Intervention(s): Medication (see eMar);Repositioned; Rest  Response to Pain Intervention: Patient Satisfied  Multiple Pain Sites: No  Vision/Hearing  Vision  Vision: Within Functional Limits  Hearing  Hearing: Within

## 2018-10-24 ENCOUNTER — HOSPITAL ENCOUNTER (OUTPATIENT)
Dept: PHYSICAL THERAPY | Age: 33
Setting detail: THERAPIES SERIES
Discharge: HOME | End: 2018-10-24
Payer: COMMERCIAL

## 2018-10-24 PROCEDURE — 97110 THERAPEUTIC EXERCISES: CPT

## 2018-10-24 ASSESSMENT — PAIN DESCRIPTION - LOCATION: LOCATION: BACK

## 2018-10-24 ASSESSMENT — PAIN SCALES - GENERAL: PAINLEVEL_OUTOF10: 6

## 2018-10-24 ASSESSMENT — PAIN DESCRIPTION - PAIN TYPE: TYPE: CHRONIC PAIN

## 2018-10-24 ASSESSMENT — PAIN DESCRIPTION - FREQUENCY: FREQUENCY: CONTINUOUS

## 2018-10-24 ASSESSMENT — PAIN DESCRIPTION - DESCRIPTORS: DESCRIPTORS: ACHING;BURNING;SHARP;CONSTANT

## 2018-10-24 ASSESSMENT — PAIN DESCRIPTION - ORIENTATION: ORIENTATION: MID

## 2018-10-25 NOTE — PROGRESS NOTES
treatment well   Plan   Plan Continue with current plan     Therapy Time:       10/24/18 1746   PT Individual Minutes   Time In 1742   Time Out 1830   Minutes 48       Treatment Charges: Minutes Units   []  Ultrasound     []  Electrical-Stim     []  Iontophoresis     []  Traction     []  Massage       []  Eval     []  Gait     []  Vasopneumatic Device     [x]  Ther Exercise 45 3    []  Manual Therapy       []  Ther Activities       []  Aquatics     []  Neuro Re-Ed       []  Other       Total Treatment Time: 45 3        Jo Ann Miller, PTA

## 2018-10-29 ENCOUNTER — HOSPITAL ENCOUNTER (OUTPATIENT)
Dept: PHYSICAL THERAPY | Age: 33
Setting detail: THERAPIES SERIES
Discharge: HOME | End: 2018-10-29
Payer: COMMERCIAL

## 2018-10-29 PROCEDURE — 97110 THERAPEUTIC EXERCISES: CPT

## 2018-10-29 ASSESSMENT — PAIN DESCRIPTION - ORIENTATION: ORIENTATION: MID

## 2018-10-29 ASSESSMENT — PAIN DESCRIPTION - DESCRIPTORS: DESCRIPTORS: CONSTANT;ACHING;BURNING;SHARP

## 2018-10-29 ASSESSMENT — PAIN DESCRIPTION - FREQUENCY: FREQUENCY: CONTINUOUS

## 2018-10-29 ASSESSMENT — PAIN SCALES - GENERAL: PAINLEVEL_OUTOF10: 6

## 2018-10-29 ASSESSMENT — PAIN DESCRIPTION - PAIN TYPE: TYPE: CHRONIC PAIN

## 2018-10-29 ASSESSMENT — PAIN DESCRIPTION - LOCATION: LOCATION: BACK

## 2018-10-29 NOTE — PROGRESS NOTES
chain of the spine. Added appropriate exercises to address these areas. Demonstrated well with min cues.    Treatment Diagnosis: Scapulothoracic Weakness  Prognosis: Good     Plan  Times per week: 3  Plan weeks: 4  Current Treatment Recommendations: Strengthening, Home Exercise Program, Patient/Caregiver Education & Training, Modalities, Pain Management    Therapy Time   Individual Concurrent Group Co-treatment   Time In 3318         Time Out 3717         Minutes 45           Treatment Charges: Minutes Units   []  Ultrasound     []  Electrical-Stim     []  Iontophoresis     []  Traction     []  Massage       []  Eval     []  Gait     [x]  Ther Exercise 45  3    []  Manual Therapy       []  Ther Activities       []  Aquatics     []  Vasopneumatic Device     []  Neuro Re-Ed       []  Other       Total Treatment Time: 39 3      Edwni Duran PT DPT

## 2018-11-01 ENCOUNTER — HOSPITAL ENCOUNTER (OUTPATIENT)
Dept: PHYSICAL THERAPY | Age: 33
Setting detail: THERAPIES SERIES
Discharge: HOME | End: 2018-11-01
Payer: COMMERCIAL

## 2018-11-01 PROCEDURE — 97110 THERAPEUTIC EXERCISES: CPT

## 2018-11-01 ASSESSMENT — PAIN DESCRIPTION - PAIN TYPE: TYPE: CHRONIC PAIN

## 2018-11-01 ASSESSMENT — PAIN SCALES - GENERAL: PAINLEVEL_OUTOF10: 6

## 2018-11-01 ASSESSMENT — PAIN DESCRIPTION - FREQUENCY: FREQUENCY: CONTINUOUS

## 2018-11-01 ASSESSMENT — PAIN DESCRIPTION - LOCATION: LOCATION: BACK

## 2018-11-01 ASSESSMENT — PAIN DESCRIPTION - ORIENTATION: ORIENTATION: MID

## 2018-11-01 ASSESSMENT — PAIN DESCRIPTION - DESCRIPTORS: DESCRIPTORS: CONSTANT;ACHING;BURNING;SHARP

## 2018-11-01 NOTE — PROGRESS NOTES
Physical Therapy  Daily Treatment Note  Date: 2018  Patient Name: Samuel Shannon  MRN: [id number]     :   1985    General  Chart Reviewed: Yes  Response To Previous Treatment: Not applicable  Family / Caregiver Present: No  Referring Practitioner: Wendie AARON  PT Visit Information  Onset Date: 10/10/18  PT Insurance Information: [facility]   Total # of Visits Approved: 12  Total # of Visits to Date: 4  No Show: 0  Canceled Appointment: 0  General Comment  Comments: History of mid back pain/ongoing for 2-3 years. Gradual onset. Cause unknown. Slight improvements have been seen in the past. However, the pain has never completely subsided. Progressively has got worse over the last several months. Saw PCP- thoracic X-ray taken, pain medication provided and outpt PT ordered. Subjective  Pain Screening  Patient Currently in Pain: Yes  Pain Assessment  Pain Assessment: 0-10  Pain Level: 6  Pain Type: Chronic pain  Pain Location: Back  Pain Orientation: Mid  Pain Descriptors: Constant; Aching;Burning; Sharp  Pain Frequency: Continuous  Multiple Pain Sites: No     Treatment Activities  Exercises  Exercise 1: UBE retro 4'   Exercise 2: Supine Hook lying posterior pelvic tilt 3 sec hold x 10 reps   Exercise 3: Supine Hook lying/Extremity Flexion 10 reps   Exercise 4: Supine Hook lying/March 10 reps   Exercise 5: Prone (B) shoulder @ 60, 90 and 120 10 reps with each one. Exercise 6: Prone Press Ups on Elbows 10 reps   Exercise 7: Seated Pelvic Rocks 10 reps   Exercise 8: Lateral Step Up 4 inch 20 reps with each leg   Exercise 9: Standing rows 20 lbs 20 reps   Exercise 10: Standing 4 way hip 10 lbs 10 reps with each direction each leg     Activity Tolerance  Patient tolerated treatment well    Assessment  Conditions Requiring Skilled Therapeutic Intervention  Body structures, Functions, Activity limitations: Decreased functional mobility ; Decreased ADL status; Decreased strength  Treatment

## 2018-11-05 ENCOUNTER — HOSPITAL ENCOUNTER (OUTPATIENT)
Dept: PHYSICAL THERAPY | Age: 33
Setting detail: THERAPIES SERIES
Discharge: HOME | End: 2018-11-05
Payer: COMMERCIAL

## 2018-11-07 ENCOUNTER — HOSPITAL ENCOUNTER (OUTPATIENT)
Dept: PHYSICAL THERAPY | Age: 33
Setting detail: THERAPIES SERIES
Discharge: HOME | End: 2018-11-07
Payer: COMMERCIAL

## 2018-11-07 PROCEDURE — 97110 THERAPEUTIC EXERCISES: CPT

## 2018-11-07 ASSESSMENT — PAIN DESCRIPTION - FREQUENCY: FREQUENCY: CONTINUOUS

## 2018-11-07 ASSESSMENT — PAIN DESCRIPTION - ORIENTATION: ORIENTATION: MID

## 2018-11-07 ASSESSMENT — PAIN SCALES - GENERAL: PAINLEVEL_OUTOF10: 7

## 2018-11-07 ASSESSMENT — PAIN DESCRIPTION - LOCATION: LOCATION: BACK

## 2018-11-07 ASSESSMENT — PAIN DESCRIPTION - PAIN TYPE: TYPE: CHRONIC PAIN

## 2018-11-07 ASSESSMENT — PAIN DESCRIPTION - DESCRIPTORS: DESCRIPTORS: CONSTANT;ACHING

## 2018-11-12 ENCOUNTER — HOSPITAL ENCOUNTER (OUTPATIENT)
Dept: PHYSICAL THERAPY | Age: 33
Setting detail: THERAPIES SERIES
Discharge: HOME | End: 2018-11-12
Payer: COMMERCIAL

## 2018-11-12 PROCEDURE — 97110 THERAPEUTIC EXERCISES: CPT

## 2018-11-12 ASSESSMENT — PAIN DESCRIPTION - FREQUENCY: FREQUENCY: CONTINUOUS

## 2018-11-12 ASSESSMENT — PAIN DESCRIPTION - ORIENTATION: ORIENTATION: MID

## 2018-11-12 ASSESSMENT — PAIN DESCRIPTION - LOCATION: LOCATION: BACK

## 2018-11-12 ASSESSMENT — PAIN SCALES - GENERAL: PAINLEVEL_OUTOF10: 7

## 2018-11-12 ASSESSMENT — PAIN DESCRIPTION - PAIN TYPE: TYPE: CHRONIC PAIN

## 2018-11-12 ASSESSMENT — PAIN DESCRIPTION - DESCRIPTORS: DESCRIPTORS: CONSTANT;ACHING;THROBBING

## 2018-11-13 NOTE — PROGRESS NOTES
Home Exercise Program, Patient/Caregiver Education & Training, Modalities, Pain Management    Therapy Time   Individual Concurrent Group Co-treatment   Time In 5724         Time Out 1800         Minutes 45           Treatment Charges: Minutes Units   []  Ultrasound     []  Electrical-Stim     []  Iontophoresis     []  Traction     []  Massage       []  Eval     []  Gait     [x]  Ther Exercise 45  3    []  Manual Therapy       []  Ther Activities       []  Aquatics     []  Vasopneumatic Device     []  Neuro Re-Ed       []  Other       Total Treatment Time: 39 3      Gardenia Coronado PT DPT

## 2018-11-15 ENCOUNTER — APPOINTMENT (OUTPATIENT)
Dept: PHYSICAL THERAPY | Age: 33
End: 2018-11-15
Payer: COMMERCIAL

## 2018-11-19 ENCOUNTER — HOSPITAL ENCOUNTER (OUTPATIENT)
Dept: PHYSICAL THERAPY | Age: 33
Setting detail: THERAPIES SERIES
Discharge: HOME | End: 2018-11-19
Payer: COMMERCIAL

## 2018-11-19 PROCEDURE — 97110 THERAPEUTIC EXERCISES: CPT

## 2018-11-19 ASSESSMENT — PAIN DESCRIPTION - DESCRIPTORS: DESCRIPTORS: CONSTANT;ACHING

## 2018-11-19 ASSESSMENT — PAIN DESCRIPTION - ORIENTATION: ORIENTATION: MID

## 2018-11-19 ASSESSMENT — PAIN DESCRIPTION - FREQUENCY: FREQUENCY: CONTINUOUS

## 2018-11-19 ASSESSMENT — PAIN SCALES - GENERAL: PAINLEVEL_OUTOF10: 6

## 2018-11-19 ASSESSMENT — PAIN DESCRIPTION - LOCATION: LOCATION: BACK

## 2018-11-19 ASSESSMENT — PAIN DESCRIPTION - PAIN TYPE: TYPE: CHRONIC PAIN

## 2018-11-26 ENCOUNTER — APPOINTMENT (OUTPATIENT)
Dept: PHYSICAL THERAPY | Age: 33
End: 2018-11-26
Payer: COMMERCIAL

## 2018-11-28 ENCOUNTER — HOSPITAL ENCOUNTER (OUTPATIENT)
Dept: PHYSICAL THERAPY | Age: 33
Setting detail: THERAPIES SERIES
Discharge: HOME | End: 2018-11-28
Payer: COMMERCIAL

## 2018-11-28 PROCEDURE — 97110 THERAPEUTIC EXERCISES: CPT

## 2018-11-28 ASSESSMENT — PAIN DESCRIPTION - DESCRIPTORS: DESCRIPTORS: CONSTANT;ACHING

## 2018-11-28 ASSESSMENT — PAIN SCALES - GENERAL: PAINLEVEL_OUTOF10: 7

## 2018-11-28 ASSESSMENT — PAIN DESCRIPTION - PAIN TYPE: TYPE: CHRONIC PAIN

## 2018-11-28 ASSESSMENT — PAIN DESCRIPTION - FREQUENCY: FREQUENCY: CONTINUOUS

## 2018-11-28 ASSESSMENT — PAIN DESCRIPTION - LOCATION: LOCATION: BACK

## 2018-11-28 ASSESSMENT — PAIN DESCRIPTION - ORIENTATION: ORIENTATION: MID

## 2018-11-29 ENCOUNTER — HOSPITAL ENCOUNTER (OUTPATIENT)
Dept: PHYSICAL THERAPY | Age: 33
Setting detail: THERAPIES SERIES
Discharge: HOME | End: 2018-11-29
Payer: COMMERCIAL

## 2018-11-29 PROCEDURE — 97110 THERAPEUTIC EXERCISES: CPT

## 2018-11-29 ASSESSMENT — PAIN DESCRIPTION - ORIENTATION: ORIENTATION: MID

## 2018-11-29 ASSESSMENT — PAIN DESCRIPTION - FREQUENCY: FREQUENCY: CONTINUOUS

## 2018-11-29 ASSESSMENT — PAIN DESCRIPTION - LOCATION: LOCATION: BACK

## 2018-11-29 ASSESSMENT — PAIN DESCRIPTION - ONSET: ONSET: GRADUAL

## 2018-11-29 ASSESSMENT — PAIN DESCRIPTION - PAIN TYPE: TYPE: CHRONIC PAIN

## 2018-11-29 ASSESSMENT — PAIN SCALES - GENERAL: PAINLEVEL_OUTOF10: 6

## 2018-11-29 ASSESSMENT — PAIN DESCRIPTION - DESCRIPTORS: DESCRIPTORS: CONSTANT;ACHING

## 2018-11-29 NOTE — PROGRESS NOTES
PT FOLLOW UP: Yes        Goals:  Short term goals  Time Frame for Short term goals: 6 visits   Short term goal 1: Pt will report an average pain level of 3-4/10 within the thoracic region at rest/ADLs. Short term goal 2: Pt will be independent with his home exercise program.   Long term goals  Time Frame for Long term goals : 12 visits   Long term goal 1: Pt will report an average pain level of a 1-2/10 within the thoracic region at rest/ADLs. Long term goal 2: Pt will demonstrate a 3-4/5 MMT with the (B) scapulothoracic region to assist with (I) function. Long term goal 3: Pt will be able to perform all of hsi desired ADLs with less pain/limitation.    Patient Goals   Patient goals : Learn Exercises     Plan:    Plan  Current Treatment Recommendations: Strengthening, Home Exercise Program, Patient/Caregiver Education & Training, Modalities, Pain Management      Treatment Charges: Minutes Units   []  Ultrasound     []  Electrical-Stim     []  Iontophoresis     []  Traction     []  Massage       []  Eval     []  Gait     [x]  Ther Exercise 40   3   []  Manual Therapy       []  Ther Activities       []  Aquatics     []  Vasopneumatic Device     []  Neuro Re-Ed       []  Other       Total Treatment Time: 40  3       Therapy Time   Individual Concurrent Group Co-treatment   Time In  7424         Time Out  1800         [address] Ana Lilia Zarco, PT

## 2018-12-03 ENCOUNTER — HOSPITAL ENCOUNTER (OUTPATIENT)
Dept: PHYSICAL THERAPY | Age: 33
Setting detail: THERAPIES SERIES
Discharge: HOME | End: 2018-12-03
Payer: COMMERCIAL

## 2018-12-03 PROCEDURE — 97110 THERAPEUTIC EXERCISES: CPT

## 2018-12-03 ASSESSMENT — PAIN DESCRIPTION - DESCRIPTORS: DESCRIPTORS: ACHING;CONSTANT

## 2018-12-03 ASSESSMENT — PAIN DESCRIPTION - ORIENTATION: ORIENTATION: MID

## 2018-12-03 ASSESSMENT — PAIN DESCRIPTION - PAIN TYPE: TYPE: CHRONIC PAIN

## 2018-12-03 ASSESSMENT — PAIN DESCRIPTION - FREQUENCY: FREQUENCY: CONTINUOUS

## 2018-12-03 ASSESSMENT — PAIN DESCRIPTION - LOCATION: LOCATION: BACK

## 2018-12-03 ASSESSMENT — PAIN SCALES - GENERAL: PAINLEVEL_OUTOF10: 6

## 2018-12-06 ENCOUNTER — HOSPITAL ENCOUNTER (OUTPATIENT)
Dept: PHYSICAL THERAPY | Age: 33
Setting detail: THERAPIES SERIES
Discharge: HOME | End: 2018-12-06
Payer: COMMERCIAL

## 2018-12-06 PROCEDURE — 97110 THERAPEUTIC EXERCISES: CPT

## 2018-12-06 ASSESSMENT — PAIN SCALES - GENERAL: PAINLEVEL_OUTOF10: 7

## 2018-12-06 ASSESSMENT — PAIN DESCRIPTION - DESCRIPTORS: DESCRIPTORS: CONSTANT;ACHING

## 2018-12-06 ASSESSMENT — PAIN DESCRIPTION - FREQUENCY: FREQUENCY: CONTINUOUS

## 2018-12-06 ASSESSMENT — PAIN DESCRIPTION - LOCATION: LOCATION: BACK

## 2018-12-06 ASSESSMENT — PAIN DESCRIPTION - PAIN TYPE: TYPE: CHRONIC PAIN

## 2018-12-06 ASSESSMENT — PAIN DESCRIPTION - ORIENTATION: ORIENTATION: MID

## 2018-12-06 NOTE — PROGRESS NOTES
Physical Therapy  Daily Treatment Note  Date: 2018  Patient Name: Garald Peabody  MRN: [id number]     :   1985    General  Chart Reviewed: Yes  Response To Previous Treatment: Not applicable  Family / Caregiver Present: No  Referring Practitioner: Carlton AARON  PT Visit Information  Onset Date: 10/10/18  Total # of Visits Approved: 12  Total # of Visits to Date: 11  No Show: 0  Canceled Appointment: 1    Subjective  Pt stated that his symptoms have increased since last treatment session. Pain Screening  Patient Currently in Pain: Yes  Pain Assessment  Pain Assessment: 0-10  Pain Level: 7  Pain Type: Chronic pain  Pain Location: Back  Pain Orientation: Mid  Pain Descriptors: Constant; Aching  Pain Frequency: Continuous  Multiple Pain Sites: No     Treatment Activities  Exercises  Exercise 1: Foam Roll/Supine spine stretch 1 minute hold x 3 reps   Exercise 2: Supine Hook lying Bridge with SLR 10 reps with each leg  Exercise 3: Quadruped Arm/opposite Leg 10 sec hold  5 reps with each side  Exercise 4: Prone (B) shoulder @ 60, 90 and 120 5 lbs,  10 reps x 3 sets  with each one  Exercise 5: Seated (R) thoracic rotation stretch 30 sec hold with over pressure x 3 reps   Exercise 8: Lateral Step Up 10 inch 20 reps with each leg   Exercise 9: Standing rows/pull downs 35 lbs 20 reps x 3 sets with each one  Exercise 12: Lateral walks 2 x 25' with purple band  Exercise 13: Monster walks 2 x 25', forward/backward with purple band    Activity Tolerance  Patient tolerated treatment well    Assessment  Conditions Requiring Skilled Therapeutic Intervention  Body structures, Functions, Activity limitations: Decreased functional mobility ; Decreased ADL status; Decreased strength  Treatment Diagnosis: Scapulothoracic Weakness  Prognosis: Good    Plan  Times per week: 3  Plan weeks: 4  Current Treatment Recommendations: Strengthening, Home Exercise Program, Patient/Caregiver Education & Training, Modalities, Pain Management    Therapy Time   Individual Concurrent Group Co-treatment   Time In 5753         Time Out 2126         Minutes 60           Treatment Charges: Minutes Units   []  Ultrasound     []  Electrical-Stim     []  Iontophoresis     []  Traction     []  Massage       []  Eval     []  Gait     [x]  Ther Exercise 60  4    []  Manual Therapy       []  Ther Activities       []  Aquatics     []  Vasopneumatic Device     []  Neuro Re-Ed       []  Other       Total Treatment Time: 61 4      REJI CruzT

## 2018-12-12 ENCOUNTER — HOSPITAL ENCOUNTER (OUTPATIENT)
Dept: PHYSICAL THERAPY | Age: 33
Setting detail: THERAPIES SERIES
Discharge: HOME | End: 2018-12-12
Payer: COMMERCIAL

## 2018-12-12 PROCEDURE — 97110 THERAPEUTIC EXERCISES: CPT

## 2018-12-12 ASSESSMENT — PAIN DESCRIPTION - PAIN TYPE: TYPE: CHRONIC PAIN

## 2018-12-12 ASSESSMENT — PAIN DESCRIPTION - LOCATION: LOCATION: BACK

## 2018-12-12 ASSESSMENT — PAIN SCALES - GENERAL: PAINLEVEL_OUTOF10: 5

## 2018-12-12 ASSESSMENT — PAIN DESCRIPTION - PROGRESSION: CLINICAL_PROGRESSION: NOT CHANGED

## 2018-12-12 ASSESSMENT — PAIN DESCRIPTION - ORIENTATION: ORIENTATION: MID

## 2018-12-12 ASSESSMENT — PAIN DESCRIPTION - DESCRIPTORS: DESCRIPTORS: CONSTANT;ACHING

## 2018-12-12 ASSESSMENT — PAIN DESCRIPTION - FREQUENCY: FREQUENCY: CONTINUOUS

## 2018-12-13 NOTE — PROGRESS NOTES
Dexamethasone Sodium  [] Manual Therapy             Phosphate 40-80 mA min  [] Aquatic Therapy                         [] Other:  More objective information is available upon request.                      Physical Therapy Prescription:      [x] Continue current Rx    [] Therapist Discretion    [] Rx as below  Recommended Changes to Treatment:  [] Heat/Cold  [] Stretching  [] Therapeutic Exercise  [] Reconditioning  [] Massage  [] Ultrasound  [] Electrical Stim  [] Iontophoresis: 40 mg/ml Dexamethasone Sodium Phosphate 40-80 mA min  [] Aquatics  [] Other:  Frequency:    3      X/wk for    4      wks    Medicare/Regulatory Requirements:  I have reviewed this plan of care and certify a need for medically necessary rehabilitation services.   [] Physician Signature                                                   Date:       Thank you for your referral                    Electronically signed by: Miguel A Navarro, PT DPT    [facility]) @ [place] [address].  Phone [phone]  Fax [phone]    Therapy Time   Individual Concurrent Group Co-treatment   Time In 1730         Time Out 1845         Minutes 75           Treatment Charges: Minutes Units   []  Ultrasound     []  Electrical-Stim     []  Iontophoresis     []  Traction     []  Massage       []  Eval     []  Gait     [x]  Ther Exercise 60  4    []  Manual Therapy       []  Ther Activities       []  Aquatics     []  Vasopneumatic Device     []  Neuro Re-Ed       []  Other       Total Treatment Time: 60 4

## 2018-12-26 ENCOUNTER — HOSPITAL ENCOUNTER (OUTPATIENT)
Dept: PHYSICAL THERAPY | Age: 33
Setting detail: THERAPIES SERIES
Discharge: HOME | End: 2018-12-26
Payer: COMMERCIAL

## 2018-12-26 PROCEDURE — 97110 THERAPEUTIC EXERCISES: CPT

## 2018-12-26 ASSESSMENT — PAIN DESCRIPTION - PROGRESSION: CLINICAL_PROGRESSION: NOT CHANGED

## 2018-12-26 ASSESSMENT — PAIN DESCRIPTION - ORIENTATION: ORIENTATION: MID

## 2018-12-26 ASSESSMENT — PAIN DESCRIPTION - LOCATION: LOCATION: BACK

## 2018-12-26 ASSESSMENT — PAIN SCALES - GENERAL: PAINLEVEL_OUTOF10: 5

## 2018-12-26 ASSESSMENT — PAIN DESCRIPTION - PAIN TYPE: TYPE: CHRONIC PAIN

## 2018-12-26 NOTE — PROGRESS NOTES
[facility]   Outpatient Physical Therapy  [address] [place] [address]. [address]  Phone: [phone]  Fax: [phone]  Daily Progress Note    Date: 18    Patient Name: Crow Zambrano        MRN: [id number]  Account: [de-identified] : 1985      General Information  Chart Reviewed: Yes  Patient assessed for rehabilitation services?: Yes  Response To Previous Treatment: Not applicable  Family / Caregiver Present: No  Referring Practitioner: Adwoa HERNANDEZ-EVIE  Referral Date : 10/10/18  Diagnosis: Dextroscoliosis (M41.80)   Follows Commands: Within Functional Limits  Onset Date: 10/10/18  PT Insurance Information: [facility]   Total # of Visits Approved: 12  Total # of Visits to Date: 1 (per new orders )  No Show: 0  Canceled Appointment: 1    Subjective  No new complaints      Pain  Patient Currently in Pain: Yes  Pain Assessment: 0-10  Pain Level: 5  Pain Type: Chronic pain  Pain Location: Back  Pain Orientation: Mid  Clinical Progression: Not changed  Response to Pain Intervention: Patient Satisfied    Objective  Exercise 1: Foam Roll/Supine spine stretch 1 minute hold x 3 reps   Exercise 4: Prone (B) shoulder @ 60, 90 and 120  5 lbs,  15 reps x 3 sets  with each one  Exercise 5: Seated (R) thoracic rotation stretch 30 sec hold with over pressure x 3 reps   Exercise 8: Lateral Step Up 10 inch 20 reps with each leg   Exercise 9: Standing rows/pull downs 40 lbs 20 reps x 3 sets with each one  Exercise 10: Standing 4 way hip 30 lbs 10 reps with each direction each leg   Exercise 12: Lateral walks 2 x 25' with purple band  Exercise 13: Monster walks 2 x 25', forward/backward with purple band    Assessment  Body structures, Functions, Activity limitations: Decreased functional mobility ; Decreased ADL status; Decreased strength  Treatment Diagnosis: Scapulothoracic Weakness    Activity Tolerance  Patient Tolerated treatment well    Plan  Continue with current plan    Therapy Time  Time In: 1715  Time Out: 1807  Minutes: 52  Timed Code Treatment Minutes: 45 Minutes    Treatment Charges: Minutes Units   []  Ultrasound     []  Electrical-Stim     []  Iontophoresis     []  Traction     []  Massage       []  Eval     []  Gait     []  Vasopneumatic Device     [x]  Ther Exercise 45  3   []  Manual Therapy       []  Ther Activities       []  Aquatics     []  Neuro Re-Ed       []  Other       Total Treatment Time: 39 3     Rahel Listen, PTA

## 2018-12-27 ENCOUNTER — HOSPITAL ENCOUNTER (OUTPATIENT)
Dept: PHYSICAL THERAPY | Age: 33
Setting detail: THERAPIES SERIES
Discharge: HOME | End: 2018-12-27
Payer: COMMERCIAL

## 2018-12-27 PROCEDURE — 97110 THERAPEUTIC EXERCISES: CPT

## 2018-12-27 ASSESSMENT — PAIN DESCRIPTION - FREQUENCY: FREQUENCY: CONTINUOUS

## 2018-12-27 ASSESSMENT — PAIN DESCRIPTION - ORIENTATION: ORIENTATION: MID

## 2018-12-27 ASSESSMENT — PAIN DESCRIPTION - LOCATION: LOCATION: BACK

## 2018-12-27 ASSESSMENT — PAIN DESCRIPTION - PAIN TYPE: TYPE: CHRONIC PAIN

## 2018-12-27 ASSESSMENT — PAIN DESCRIPTION - DESCRIPTORS: DESCRIPTORS: CONSTANT;ACHING

## 2018-12-27 ASSESSMENT — PAIN SCALES - GENERAL: PAINLEVEL_OUTOF10: 5

## 2018-12-28 NOTE — PROGRESS NOTES
week: 3  Plan weeks: 4  Current Treatment Recommendations: Strengthening, Home Exercise Program, Patient/Caregiver Education & Training, Modalities, Pain Management    Therapy Time   Individual Concurrent Group Co-treatment   Time In 6371         Time Out 1800         Minutes 45           Treatment Charges: Minutes Units   []  Ultrasound     []  Electrical-Stim     []  Iontophoresis     []  Traction     []  Massage       []  Eval     []  Gait     [x]  Ther Exercise 45  3    []  Manual Therapy       []  Ther Activities       []  Aquatics     []  Vasopneumatic Device     []  Neuro Re-Ed       []  Other       Total Treatment Time: 39 3      Blanquita Spence, PT DPT

## 2019-01-02 ENCOUNTER — HOSPITAL ENCOUNTER (OUTPATIENT)
Dept: PHYSICAL THERAPY | Age: 34
Setting detail: THERAPIES SERIES
Discharge: HOME | End: 2019-01-02
Payer: MEDICARE

## 2019-01-02 PROCEDURE — 97110 THERAPEUTIC EXERCISES: CPT

## 2019-01-02 ASSESSMENT — PAIN DESCRIPTION - PAIN TYPE: TYPE: CHRONIC PAIN

## 2019-01-02 ASSESSMENT — PAIN DESCRIPTION - ORIENTATION: ORIENTATION: MID

## 2019-01-02 ASSESSMENT — PAIN DESCRIPTION - FREQUENCY: FREQUENCY: CONTINUOUS

## 2019-01-02 ASSESSMENT — PAIN DESCRIPTION - LOCATION: LOCATION: BACK

## 2019-01-02 ASSESSMENT — PAIN SCALES - GENERAL: PAINLEVEL_OUTOF10: 5

## 2019-01-02 ASSESSMENT — PAIN DESCRIPTION - DESCRIPTORS: DESCRIPTORS: CONSTANT

## 2019-01-09 ENCOUNTER — HOSPITAL ENCOUNTER (OUTPATIENT)
Dept: PHYSICAL THERAPY | Age: 34
Setting detail: THERAPIES SERIES
Discharge: HOME | End: 2019-01-09
Payer: MEDICARE

## 2019-01-09 PROCEDURE — 97110 THERAPEUTIC EXERCISES: CPT

## 2019-01-09 ASSESSMENT — PAIN DESCRIPTION - ORIENTATION: ORIENTATION: MID

## 2019-01-09 ASSESSMENT — PAIN DESCRIPTION - LOCATION: LOCATION: BACK

## 2019-01-09 ASSESSMENT — PAIN DESCRIPTION - FREQUENCY: FREQUENCY: CONTINUOUS

## 2019-01-09 ASSESSMENT — PAIN SCALES - GENERAL: PAINLEVEL_OUTOF10: 5

## 2019-01-09 ASSESSMENT — PAIN DESCRIPTION - PAIN TYPE: TYPE: CHRONIC PAIN

## 2019-01-09 ASSESSMENT — PAIN DESCRIPTION - DESCRIPTORS: DESCRIPTORS: CONSTANT;ACHING

## 2019-01-10 ENCOUNTER — HOSPITAL ENCOUNTER (OUTPATIENT)
Dept: PHYSICAL THERAPY | Age: 34
Setting detail: THERAPIES SERIES
Discharge: HOME | End: 2019-01-10
Payer: MEDICARE

## 2019-01-10 PROCEDURE — 97110 THERAPEUTIC EXERCISES: CPT

## 2019-01-10 ASSESSMENT — PAIN DESCRIPTION - DESCRIPTORS
DESCRIPTORS: CONSTANT;ACHING
DESCRIPTORS: CONSTANT;ACHING

## 2019-01-10 ASSESSMENT — PAIN DESCRIPTION - PAIN TYPE
TYPE: CHRONIC PAIN
TYPE: CHRONIC PAIN

## 2019-01-10 ASSESSMENT — PAIN SCALES - GENERAL
PAINLEVEL_OUTOF10: 5
PAINLEVEL_OUTOF10: 5

## 2019-01-10 ASSESSMENT — PAIN DESCRIPTION - ORIENTATION
ORIENTATION: MID
ORIENTATION: MID

## 2019-01-10 ASSESSMENT — PAIN DESCRIPTION - LOCATION
LOCATION: BACK
LOCATION: BACK

## 2019-01-10 ASSESSMENT — PAIN DESCRIPTION - FREQUENCY
FREQUENCY: CONTINUOUS
FREQUENCY: CONTINUOUS

## 2019-01-11 ASSESSMENT — PAIN DESCRIPTION - LOCATION: LOCATION: BACK

## 2019-01-11 ASSESSMENT — PAIN SCALES - GENERAL: PAINLEVEL_OUTOF10: 5

## 2019-01-11 ASSESSMENT — PAIN DESCRIPTION - FREQUENCY: FREQUENCY: CONTINUOUS

## 2019-01-11 ASSESSMENT — PAIN DESCRIPTION - ORIENTATION: ORIENTATION: MID

## 2019-01-11 ASSESSMENT — PAIN DESCRIPTION - DESCRIPTORS: DESCRIPTORS: CONSTANT;ACHING

## 2019-01-11 ASSESSMENT — PAIN DESCRIPTION - PAIN TYPE: TYPE: CHRONIC PAIN

## 2019-01-14 ENCOUNTER — HOSPITAL ENCOUNTER (OUTPATIENT)
Dept: PHYSICAL THERAPY | Age: 34
Setting detail: THERAPIES SERIES
Discharge: HOME | End: 2019-01-14
Payer: MEDICARE

## 2019-01-14 PROCEDURE — 97110 THERAPEUTIC EXERCISES: CPT

## 2019-01-14 ASSESSMENT — PAIN DESCRIPTION - ORIENTATION: ORIENTATION: MID

## 2019-01-14 ASSESSMENT — PAIN DESCRIPTION - PAIN TYPE: TYPE: CHRONIC PAIN

## 2019-01-14 ASSESSMENT — PAIN DESCRIPTION - DESCRIPTORS: DESCRIPTORS: CONSTANT;DISCOMFORT

## 2019-01-14 ASSESSMENT — PAIN DESCRIPTION - LOCATION: LOCATION: BACK

## 2019-01-14 ASSESSMENT — PAIN DESCRIPTION - FREQUENCY: FREQUENCY: CONTINUOUS

## 2019-01-14 ASSESSMENT — PAIN SCALES - GENERAL: PAINLEVEL_OUTOF10: 7

## 2019-01-17 ENCOUNTER — HOSPITAL ENCOUNTER (OUTPATIENT)
Dept: PHYSICAL THERAPY | Age: 34
Setting detail: THERAPIES SERIES
Discharge: HOME | End: 2019-01-17
Payer: MEDICARE

## 2019-01-21 ENCOUNTER — HOSPITAL ENCOUNTER (OUTPATIENT)
Dept: PHYSICAL THERAPY | Age: 34
Setting detail: THERAPIES SERIES
Discharge: HOME | End: 2019-01-21
Payer: MEDICARE

## 2019-01-21 PROCEDURE — 97110 THERAPEUTIC EXERCISES: CPT

## 2019-01-21 ASSESSMENT — PAIN DESCRIPTION - LOCATION: LOCATION: BACK

## 2019-01-21 ASSESSMENT — PAIN DESCRIPTION - DESCRIPTORS: DESCRIPTORS: BURNING;DISCOMFORT

## 2019-01-21 ASSESSMENT — PAIN DESCRIPTION - PAIN TYPE: TYPE: CHRONIC PAIN

## 2019-01-21 ASSESSMENT — PAIN DESCRIPTION - ORIENTATION: ORIENTATION: MID

## 2019-01-21 ASSESSMENT — PAIN SCALES - GENERAL: PAINLEVEL_OUTOF10: 5

## 2019-01-21 ASSESSMENT — PAIN DESCRIPTION - FREQUENCY: FREQUENCY: CONTINUOUS

## 2019-01-24 ENCOUNTER — HOSPITAL ENCOUNTER (OUTPATIENT)
Dept: PHYSICAL THERAPY | Age: 34
Setting detail: THERAPIES SERIES
Discharge: HOME | End: 2019-01-24
Payer: MEDICARE

## 2019-01-24 PROCEDURE — 97110 THERAPEUTIC EXERCISES: CPT

## 2019-01-24 PROCEDURE — G0283 ELEC STIM OTHER THAN WOUND: HCPCS

## 2019-01-24 ASSESSMENT — PAIN DESCRIPTION - FREQUENCY: FREQUENCY: CONTINUOUS

## 2019-01-24 ASSESSMENT — PAIN DESCRIPTION - DESCRIPTORS: DESCRIPTORS: CONSTANT;ACHING;THROBBING

## 2019-01-24 ASSESSMENT — PAIN DESCRIPTION - PAIN TYPE: TYPE: CHRONIC PAIN

## 2019-01-24 ASSESSMENT — PAIN SCALES - GENERAL: PAINLEVEL_OUTOF10: 8

## 2019-01-24 ASSESSMENT — PAIN DESCRIPTION - LOCATION: LOCATION: BACK

## 2019-02-07 ENCOUNTER — HOSPITAL ENCOUNTER (OUTPATIENT)
Dept: PHYSICAL THERAPY | Age: 34
Setting detail: THERAPIES SERIES
Discharge: HOME | End: 2019-02-07
Payer: MEDICARE

## 2019-02-07 PROCEDURE — 97110 THERAPEUTIC EXERCISES: CPT

## 2019-02-07 PROCEDURE — G0283 ELEC STIM OTHER THAN WOUND: HCPCS

## 2019-02-07 ASSESSMENT — PAIN DESCRIPTION - DESCRIPTORS: DESCRIPTORS: CONSTANT;ACHING;THROBBING;TIGHTNESS

## 2019-02-07 ASSESSMENT — PAIN DESCRIPTION - FREQUENCY: FREQUENCY: CONTINUOUS

## 2019-02-07 ASSESSMENT — PAIN DESCRIPTION - LOCATION: LOCATION: BACK

## 2019-02-07 ASSESSMENT — PAIN SCALES - GENERAL: PAINLEVEL_OUTOF10: 9

## 2019-02-07 ASSESSMENT — PAIN DESCRIPTION - ORIENTATION: ORIENTATION: MID

## 2019-02-07 ASSESSMENT — PAIN DESCRIPTION - PAIN TYPE: TYPE: CHRONIC PAIN

## 2019-02-14 ENCOUNTER — HOSPITAL ENCOUNTER (OUTPATIENT)
Dept: PHYSICAL THERAPY | Age: 34
Setting detail: THERAPIES SERIES
Discharge: HOME | End: 2019-02-14
Payer: MEDICARE

## 2019-02-18 ENCOUNTER — HOSPITAL ENCOUNTER (OUTPATIENT)
Dept: PHYSICAL THERAPY | Age: 34
Setting detail: THERAPIES SERIES
Discharge: HOME | End: 2019-02-18
Payer: MEDICARE

## 2019-02-18 PROCEDURE — G0283 ELEC STIM OTHER THAN WOUND: HCPCS

## 2019-02-18 PROCEDURE — 97110 THERAPEUTIC EXERCISES: CPT

## 2019-02-18 ASSESSMENT — PAIN DESCRIPTION - PROGRESSION: CLINICAL_PROGRESSION: GRADUALLY IMPROVING

## 2019-02-18 ASSESSMENT — PAIN DESCRIPTION - FREQUENCY: FREQUENCY: INTERMITTENT

## 2019-02-18 ASSESSMENT — PAIN DESCRIPTION - PAIN TYPE: TYPE: CHRONIC PAIN

## 2019-02-18 ASSESSMENT — PAIN DESCRIPTION - DESCRIPTORS: DESCRIPTORS: ACHING;DULL

## 2019-02-18 ASSESSMENT — PAIN DESCRIPTION - LOCATION: LOCATION: BACK

## 2019-02-18 ASSESSMENT — PAIN DESCRIPTION - ORIENTATION: ORIENTATION: MID

## 2019-02-18 ASSESSMENT — PAIN SCALES - GENERAL: PAINLEVEL_OUTOF10: 6

## 2019-02-21 ENCOUNTER — APPOINTMENT (OUTPATIENT)
Dept: PHYSICAL THERAPY | Age: 34
End: 2019-02-21
Payer: MEDICARE

## 2019-02-25 ENCOUNTER — HOSPITAL ENCOUNTER (OUTPATIENT)
Dept: PHYSICAL THERAPY | Age: 34
Setting detail: THERAPIES SERIES
Discharge: HOME | End: 2019-02-25
Payer: MEDICARE

## 2019-02-25 PROCEDURE — 97110 THERAPEUTIC EXERCISES: CPT

## 2019-02-25 ASSESSMENT — PAIN DESCRIPTION - DESCRIPTORS: DESCRIPTORS: ACHING;DULL;CONSTANT

## 2019-02-25 ASSESSMENT — PAIN DESCRIPTION - PAIN TYPE: TYPE: CHRONIC PAIN

## 2019-02-25 ASSESSMENT — PAIN DESCRIPTION - ORIENTATION: ORIENTATION: MID

## 2019-02-25 ASSESSMENT — PAIN DESCRIPTION - FREQUENCY: FREQUENCY: CONTINUOUS

## 2019-02-25 ASSESSMENT — PAIN SCALES - GENERAL: PAINLEVEL_OUTOF10: 8

## 2019-02-25 ASSESSMENT — PAIN DESCRIPTION - PROGRESSION: CLINICAL_PROGRESSION: GRADUALLY WORSENING

## 2019-02-25 ASSESSMENT — PAIN DESCRIPTION - LOCATION: LOCATION: BACK

## 2019-02-25 ASSESSMENT — PAIN DESCRIPTION - ONSET: ONSET: GRADUAL

## 2019-02-27 ASSESSMENT — PAIN SCALES - GENERAL: PAINLEVEL_OUTOF10: 8

## 2019-02-27 ASSESSMENT — PAIN DESCRIPTION - LOCATION: LOCATION: BACK

## 2019-02-27 ASSESSMENT — PAIN DESCRIPTION - FREQUENCY: FREQUENCY: CONTINUOUS

## 2019-02-27 ASSESSMENT — PAIN DESCRIPTION - PROGRESSION: CLINICAL_PROGRESSION: GRADUALLY WORSENING

## 2019-02-27 ASSESSMENT — PAIN DESCRIPTION - ORIENTATION: ORIENTATION: MID

## 2019-02-27 ASSESSMENT — PAIN DESCRIPTION - DESCRIPTORS: DESCRIPTORS: CONSTANT;ACHING;DULL

## 2019-02-27 ASSESSMENT — PAIN DESCRIPTION - ONSET: ONSET: GRADUAL

## 2019-02-27 ASSESSMENT — PAIN DESCRIPTION - PAIN TYPE: TYPE: CHRONIC PAIN

## 2019-03-13 ENCOUNTER — HOSPITAL ENCOUNTER (OUTPATIENT)
Dept: MRI IMAGING | Facility: CLINIC | Age: 34
Discharge: HOME | End: 2019-03-15
Payer: MEDICARE

## 2019-03-13 DIAGNOSIS — M62.838 MUSCLE SPASM: ICD-10-CM

## 2019-03-13 DIAGNOSIS — M25.512 PAIN IN JOINT OF LEFT SHOULDER: ICD-10-CM

## 2019-03-13 PROCEDURE — 73221 MRI JOINT UPR EXTREM W/O DYE: CPT

## 2019-04-08 DIAGNOSIS — M25.512 ACUTE PAIN OF LEFT SHOULDER: Primary | ICD-10-CM

## 2019-04-09 ENCOUNTER — OFFICE VISIT (OUTPATIENT)
Dept: ORTHOPEDIC SURGERY | Age: 34
End: 2019-04-09
Payer: MEDICARE

## 2019-04-09 DIAGNOSIS — G25.89 SCAPULAR DYSKINESIS: Primary | ICD-10-CM

## 2019-04-09 PROCEDURE — 4004F PT TOBACCO SCREEN RCVD TLK: CPT | Performed by: ORTHOPAEDIC SURGERY

## 2019-04-09 PROCEDURE — G8419 CALC BMI OUT NRM PARAM NOF/U: HCPCS | Performed by: ORTHOPAEDIC SURGERY

## 2019-04-09 PROCEDURE — 99203 OFFICE O/P NEW LOW 30 MIN: CPT | Performed by: ORTHOPAEDIC SURGERY

## 2019-04-09 PROCEDURE — G8427 DOCREV CUR MEDS BY ELIG CLIN: HCPCS | Performed by: ORTHOPAEDIC SURGERY

## 2019-04-09 NOTE — PROGRESS NOTES
history includes No Known Problems in his father, mother, and sister. Physical Exam:     There were no vitals taken for this visit. General Appearance: alert, well appearing, and in no distress  Mental Status: alert, oriented to person, place, and time  Gait: normal  Head and neck: Full range of motion but pain in the shoulder blade with motion.   He also has pain localized to the same area with Spurling's test    Shoulder:    Skin: warm and dry, no rash or erythema; no swelling or obvious muscular atrophy  Vasculature: 2+ radial pulses bilaterally  Neuro: Sensation grossly intact to light touch diffusely  Tenderness: Nontender to [id number]    ROM: (Degrees)    Right   A P   Left   A P    Elevation  155    Elevation  155 165  Abduction  150    Abduction  150  160  ER   65    ER   65 65  IR   T10    IR   T10   90 abd/ER      90 abd/ER     90 abd/IR      90 abd/IR     Crepitation  No    Crepitation No  Dyskenesia  No    Dyskenesia Yes      Muscle strength:    Right       Left    Deltoid   5    Deltoid   5  Supraspinatus  5    Supraspinatus  5  ER   5    ER   5  IR   5    IR   5    Special tests    Right   Rotator Cuff    Left    n   Painful arc    y   n   Pain with ER    n    n   Neer's     n    n   Hawkin's    n    n   Drop Arm    n  n   Lift off/Belly Press   n  n   ER Lag    n          AC Joint  n   AC tenderness   n  n   Cross-chest adduction  n       Labrum/biceps    n   Nobles's    n   n   Biceps sheer    n      n   Speed's/Yergason's   n    n   Tenderness Biceps Groove  n    n   Noah's    n         Instability  n   Ant Apprehension   n    n   Post Apprehension   n    n   Ant Load shift    n    n   Post Load shift   n   n   Sulcus     n  n   Generalized Laxity   n  n   Relocation test   n  n   Crank test     n  n   Blane-superior escape  n       Imaging:  Xrays: 4 views of the left shoulder obtained on 4/9/19 were independently reviewed  Indications: Left shoulder pain  Findings:  Normal glenohumeral and acromioclavicular joint spaces with a type I acromion. Impression: Normal left shoulder radiograph    MRI: MRI of the left shoulder reviewed from 3/13/19 demonstrates intact rotator cuff. Biceps tendon is intact. No obvious labral or chondral surface injury. Impression/Plan:     Gonzales Baker is a 34 y.o. old male with left shoulder pain that at this time appears to be due to scapular dyskinesis. We discussed possibility of him having some referred pain from the cervical spine as well. I recommended this time return in physical therapy focusing on normalizing scapular motion positioning as well cervical spine range of motion and paraspinal strengthening. A prescription for this was provided. He may take over-the-counter NSAIDs as needed. I'll see him back my clinic in 3 months as needed. He was encouraged to return or call at anytime with questions and/or concerns.         NA = Not assessed  RTC = Rotator cuff  RCT = Rotator cuff tear  ER = External rotation  IR = Internal rotation  AC = Acromioclavicular  GH = Glenohumeral  n = No  y = Yes

## 2019-04-09 NOTE — LETTER
4/9/2019    Ilana Lynne, APRN - CNP  [address]    RE: Hadley Francis    Dear Dr. Shanda Gonzales,    Thank you for allowing me to participate in the care of Mr. Mare Cottrell. I had the opportunity to evaluate the patient on 4/9/2019. Attached you will find my evaluation and recommendations. Thanks again for the confidence you have expressed in me by allowing my participation in the care of your patient. I will keep you apprised of further developments in the patients treatment course as it progresses. If I can be of further assistance in any fashion, please feel free to contact me at your convenience.     Sincerely,        Zaina Jay  Shoulder and Elbow Surgery

## 2019-04-29 ENCOUNTER — HOSPITAL ENCOUNTER (OUTPATIENT)
Dept: PHYSICAL THERAPY | Age: 34
Setting detail: THERAPIES SERIES
Discharge: HOME | End: 2019-04-29
Payer: MEDICARE

## 2019-04-29 PROCEDURE — 97140 MANUAL THERAPY 1/> REGIONS: CPT

## 2019-04-29 PROCEDURE — 97161 PT EVAL LOW COMPLEX 20 MIN: CPT

## 2019-05-01 ASSESSMENT — PAIN DESCRIPTION - PROGRESSION: CLINICAL_PROGRESSION: GRADUALLY WORSENING

## 2019-05-01 ASSESSMENT — PAIN DESCRIPTION - LOCATION: LOCATION: BACK

## 2019-05-01 ASSESSMENT — PAIN SCALES - GENERAL: PAINLEVEL_OUTOF10: 7

## 2019-05-01 ASSESSMENT — PAIN DESCRIPTION - DESCRIPTORS: DESCRIPTORS: CONSTANT

## 2019-05-01 ASSESSMENT — PAIN DESCRIPTION - FREQUENCY: FREQUENCY: CONTINUOUS

## 2019-05-01 ASSESSMENT — PAIN DESCRIPTION - ORIENTATION: ORIENTATION: LEFT;UPPER

## 2019-05-01 ASSESSMENT — PAIN DESCRIPTION - ONSET: ONSET: GRADUAL

## 2019-05-01 NOTE — PROGRESS NOTES
History  Occupation: Full time employment  Type of occupation: Parts Tech @ [employer]     Objective  Observation/Palpation  Posture: (Pt. naturally has head turned to the (L) when looking forward)  Palpation: Tender to the touch at the spinous processes of T4-T6 and the (L) paraspinal region(s). T4-T6: (L) transverse processes are more shallow compared to the (R). Observation: Forward head, rounded shoulder(s), kyphotic with a (L) lateral shift  Range of Motion  Spine  Cervical: Flexion - WNL with increased pain at end range, Extension - WNL with increased pain at end range, Side bend to the (L) - 20 degrees, Side bend to the (R) - 40 degrees  Thoracic: Extension - WNL with pain at end range  Strength  Strength Other  Other: Scapulothoracic grossly 4/5 ((L) upper, mid and lower trap, rhomboids and serratus anterior (increased pain was noted at end range with scapular protraction). Additional Measures  Special Tests: Lateral trunk side bend with full shoulder abduction with pain at end range (B)  Sensation  Overall Sensation Status: WNL(WIth Light Touch C5- T1 (R) = (L))    Assessment  Conditions Requiring Skilled Therapeutic Intervention  Body structures, Functions, Activity limitations: Decreased functional mobility ; Decreased ADL status; Decreased strength;Decreased ROM; Increased Pain  Assessment: Increased (L) cervical side bend by 15 degrees, and (R) cervical side bend by 10 degrees. Increased ROM with lateral trunk flexion with full shoulder abduction (B) was noted following the PA mobilization.    Treatment Diagnosis: Scapulothoracic Weakness  Prognosis: Good  Decision Making: Low Complexity  History: No personal factors were apparent that may have an effect on the pt's plan of care  Exam: NPRS 7/10, decreased scapulothoracic strength, decreased cervical ROM, tenderness on T4-T6 spinous processes and transverse processes ((L) transverse processes more shallow than (R))  Clinical Presentation: Pt's symptoms are evolving    Activity Tolerance  Patient Tolerated treatment well    Goals  Short term goals  Time Frame for Short term goals: 9 visits  Short term goal 1: Pt will report an average pain level of 3-4/10 within the thoracic region at rest/ADLs. Short term goal 2: Pt will be independent with his home exercise program.  Short term goal 3: Pt. will demonstrate improved cervical ROM with decreased pain within all planes of motion to assist with (I) function. Long term goals  Time Frame for Long term goals : 24 visits  Long term goal 1: Pt will report an average pain level of a 1-2/10 within the thoracic region at rest/ADLs. Long term goal 2: Pt. will demonstrate a 5/5 MMT with th (B) scapulothoracic region to assist with function. Long term goal 3: Pt. will demonstrate full cervical ROM to assist with (I) function   Long term goal 4: Oswestry Disability Index Questionaire improved by 10 points (Parth Salcedo [employer])     Patient Goals: Relieve the pain/return to full function without limitation. Comments/Assessment: Pt would benefit from physical therapy to assist with pain control, ROM and strength for his cervical/thoracic region to allow him to perform his ADLs with less pain/limitation.      Rehab Potential:  [x] Good  [] Fair  [] Poor   Suggested Professional Referral:  [x] No  [] Yes:  Barriers to Goal Achievement:  [x] No  [] Yes:  Domestic Concerns:  [x] No  [] Yes:    Treatment Plan:  [x] Therapeutic Exercise    [x] Modalities:  [] Therapeutic Activity    [] Ultrasound  [x] Electrical Stimulation  [] Gait Training     [] Massage       [] Lumbar/Cervical Traction  [] Neuromuscular Re-education [x] Cold/hot pack [] Other:  [x] Instruction in HEP              [] Work Conditioning                                  [x] Manual Therapy                     [] Aquatic Therapy     [] Vasocompression  [] Iontophoresis: 4 mg/mL                      Dexamethasone Sodium      Phosphate 40-80mAmin (Allergies

## 2019-05-06 ENCOUNTER — HOSPITAL ENCOUNTER (OUTPATIENT)
Dept: PHYSICAL THERAPY | Age: 34
Setting detail: THERAPIES SERIES
Discharge: HOME | End: 2019-05-06
Payer: MEDICARE

## 2019-05-06 PROCEDURE — 97110 THERAPEUTIC EXERCISES: CPT

## 2019-05-06 PROCEDURE — 97140 MANUAL THERAPY 1/> REGIONS: CPT

## 2019-05-06 PROCEDURE — G0283 ELEC STIM OTHER THAN WOUND: HCPCS

## 2019-05-06 ASSESSMENT — PAIN DESCRIPTION - LOCATION: LOCATION: BACK

## 2019-05-06 ASSESSMENT — PAIN DESCRIPTION - PROGRESSION: CLINICAL_PROGRESSION: NOT CHANGED

## 2019-05-06 ASSESSMENT — PAIN DESCRIPTION - DESCRIPTORS: DESCRIPTORS: CONSTANT;ACHING;THROBBING

## 2019-05-06 ASSESSMENT — PAIN SCALES - GENERAL: PAINLEVEL_OUTOF10: 7

## 2019-05-06 ASSESSMENT — PAIN DESCRIPTION - FREQUENCY: FREQUENCY: CONTINUOUS

## 2019-05-06 ASSESSMENT — PAIN DESCRIPTION - ONSET: ONSET: GRADUAL

## 2019-05-06 ASSESSMENT — PAIN DESCRIPTION - PAIN TYPE: TYPE: CHRONIC PAIN

## 2019-05-06 ASSESSMENT — PAIN DESCRIPTION - ORIENTATION: ORIENTATION: LEFT;UPPER

## 2019-05-06 NOTE — PROGRESS NOTES
Physical Therapy  Daily Treatment Note  Date: 2019  Patient Name: Margarita Hancock  MRN: [id number]     :   1985    General  Chart Reviewed: Yes  Response To Previous Treatment: Not applicable  Family / Caregiver Present: No  Referring Practitioner: Antonio Hatfield MD  PT Visit Information  Onset Date: 19  PT Insurance Information: [facility] [place] [address] [place] [address]    Total # of Visits Approved: 24  Total # of Visits to Date: 2  No Show: 0  Canceled Appointment: 0    Subjective  Symptoms relatively unchanged since his initial evaluation. However, he felt the manual therapy provided during his last treatment did help. General Comment  Comments: History of upper back pain/ongoing for 2-3 years. Gradual onset. Cause unknown. Slight improvements have been seen in the past. However, the pain has never completely subsided. History of physical therapy in the past/slight success noted. Recently referred to Dr. Jay/outpt PT ordered. Pain Screening  Patient Currently in Pain: Yes  Pain Assessment  Pain Assessment: 0-10  Pain Level: 7  Patient's Stated Pain Goal: No pain  Pain Type: Chronic pain  Pain Location: Back  Pain Orientation: Left;Upper(T4-T6)  Pain Descriptors: Constant; Aching; Throbbing  Pain Frequency: Continuous  Pain Onset: Gradual  Clinical Progression: Not changed  Non-Pharmaceutical Pain Intervention(s): Repositioned;Rest;Heat applied;TENS  Response to Pain Intervention: Patient Satisfied  Multiple Pain Sites: No     Treatment Activities  Manual therapy  Joint mobilization: PA to the T4 - T6  (R) transverse processes (prone position)  Upper Thoracic Manipulation with arms crossed at 90 degrees (Seated position)      Exercises  Exercise 1: Seated thoracic extension/(B) shoulder flexion 10 reps   Exercise 2: Seated scapular retraction 10 reps   Exercise 3: Prone (B) shoulder extension 10 reps   Exercise 4: Prone (B) rows 10 reps   Exercise 5: Prone neck extension 10 reps   Exercise 6: Supine (B) scapular

## 2019-05-09 ENCOUNTER — HOSPITAL ENCOUNTER (OUTPATIENT)
Dept: PHYSICAL THERAPY | Age: 34
Setting detail: THERAPIES SERIES
Discharge: HOME | End: 2019-05-09
Payer: MEDICARE

## 2019-05-09 PROCEDURE — G0283 ELEC STIM OTHER THAN WOUND: HCPCS

## 2019-05-09 PROCEDURE — 97110 THERAPEUTIC EXERCISES: CPT

## 2019-05-09 ASSESSMENT — PAIN DESCRIPTION - PAIN TYPE: TYPE: CHRONIC PAIN

## 2019-05-09 ASSESSMENT — PAIN DESCRIPTION - FREQUENCY: FREQUENCY: CONTINUOUS

## 2019-05-09 ASSESSMENT — PAIN DESCRIPTION - ORIENTATION: ORIENTATION: LEFT;UPPER

## 2019-05-09 ASSESSMENT — PAIN DESCRIPTION - ONSET: ONSET: GRADUAL

## 2019-05-09 ASSESSMENT — PAIN DESCRIPTION - LOCATION: LOCATION: BACK

## 2019-05-09 ASSESSMENT — PAIN DESCRIPTION - DESCRIPTORS: DESCRIPTORS: CONSTANT;ACHING;THROBBING

## 2019-05-09 ASSESSMENT — PAIN SCALES - GENERAL: PAINLEVEL_OUTOF10: 8

## 2019-05-09 ASSESSMENT — PAIN DESCRIPTION - PROGRESSION: CLINICAL_PROGRESSION: GRADUALLY WORSENING

## 2019-05-09 NOTE — PROGRESS NOTES
10 reps   Exercise 5: Prone neck extension 10 reps   Exercise 6: Supine (B) scapular protraction 15 reps   Exercise 7: Side lying cervical side bend 10 reps (R) side only   Exercise 8: Supine PROM Head Nods 15 reps x 2 sets   Modalities  Moist heat: 15 minutes to the scapular region (Supine position)  E-stim (parameters): IFC x 15 minutes to the scapular region. (Supine position)     Activity Tolerance  Patient tolerated treatment well    Assessment  Conditions Requiring Skilled Therapeutic Intervention  Body structures, Functions, Activity limitations: Decreased functional mobility ; Decreased ADL status; Decreased strength;Decreased ROM; Increased Pain  Treatment Diagnosis: Scapulothoracic Weakness  Prognosis: Good  Patient Education: Reviewed home exercise program/demonstrated well.      Outcomes Score  Neck Disability Index Raw Score: 14    Plan  Times per week: 2-3  Plan weeks: 6-8  Current Treatment Recommendations: Strengthening, Home Exercise Program, Patient/Caregiver Education & Training, Modalities, Pain Management, Manual Therapy - Joint Manipulation    Therapy Time   Individual Concurrent Group Co-treatment   Time In 5952         Time Out 4938         Minutes 60           Treatment Charges: Minutes Units   []  Ultrasound     [x]  Electrical-Stim 15 1   []  Iontophoresis     []  Traction     []  Massage       []  Eval     []  Gait     [x]  Ther Exercise 30  2    []  Manual Therapy       []  Ther Activities       []  Aquatics     []  Vasopneumatic Device     []  Neuro Re-Ed       [x]  Other: 15  0    Total Treatment Time: 39 3      Chaparro Gore PT DPT

## 2019-05-15 ENCOUNTER — HOSPITAL ENCOUNTER (OUTPATIENT)
Dept: PHYSICAL THERAPY | Age: 34
Setting detail: THERAPIES SERIES
Discharge: HOME | End: 2019-05-15
Payer: MEDICARE

## 2019-05-15 PROCEDURE — 97140 MANUAL THERAPY 1/> REGIONS: CPT

## 2019-05-15 PROCEDURE — G0283 ELEC STIM OTHER THAN WOUND: HCPCS

## 2019-05-15 PROCEDURE — 97110 THERAPEUTIC EXERCISES: CPT

## 2019-05-15 ASSESSMENT — PAIN DESCRIPTION - FREQUENCY: FREQUENCY: CONTINUOUS

## 2019-05-15 ASSESSMENT — PAIN DESCRIPTION - LOCATION: LOCATION: BACK

## 2019-05-15 ASSESSMENT — PAIN DESCRIPTION - ORIENTATION: ORIENTATION: RIGHT;LEFT

## 2019-05-15 ASSESSMENT — PAIN DESCRIPTION - DESCRIPTORS: DESCRIPTORS: CONSTANT;ACHING;THROBBING

## 2019-05-15 ASSESSMENT — PAIN DESCRIPTION - ONSET: ONSET: GRADUAL

## 2019-05-15 ASSESSMENT — PAIN DESCRIPTION - PAIN TYPE: TYPE: CHRONIC PAIN

## 2019-05-15 ASSESSMENT — PAIN DESCRIPTION - PROGRESSION: CLINICAL_PROGRESSION: GRADUALLY IMPROVING

## 2019-05-15 ASSESSMENT — PAIN SCALES - GENERAL: PAINLEVEL_OUTOF10: 5

## 2019-05-15 NOTE — PROGRESS NOTES
Physical Therapy  Daily Treatment Note  Date: 5/15/2019  Patient Name: Penny Smith  MRN: [id number]     :   1985    General  Chart Reviewed: Yes  Response To Previous Treatment: Not applicable  Family / Caregiver Present: No  Referring Practitioner: Samantha Gonzalez MD  PT Visit Information  Onset Date: 19  PT Insurance Information: [facility]   Total # of Visits Approved: 24  Total # of Visits to Date: 4  No Show: 0  Canceled Appointment: 0    Subjective  Pt stated that he is still having difficulty sleeping/pain usually wakes him once a night. Able to fall back asleep in approx 30 minutes to an hour. General Comment  Comments: History of upper back pain/ongoing for 2-3 years. Gradual onset. Cause unknown. Slight improvements have been seen in the past. However, the pain has never completely subsided. History of physical therapy in the past/slight success noted. Recently referred to Dr. Jay/outpt PT ordered. Pain Screening  Patient Currently in Pain: Yes  Pain Assessment  Pain Assessment: 0-10  Pain Level: 5  Patient's Stated Pain Goal: No pain  Pain Type: Chronic pain  Pain Location: Back  Pain Orientation: Right;Left(T4-T6)  Pain Descriptors: Constant; Aching; Throbbing  Pain Frequency: Continuous  Pain Onset: Gradual  Clinical Progression: Gradually improving  Non-Pharmaceutical Pain Intervention(s): Repositioned;Rest;Heat applied;TENS  Response to Pain Intervention: Patient Satisfied  Multiple Pain Sites: No     Treatment Activities  Exercises  Exercise 1: Seated thoracic extension/(B) shoulder flexion 10 reps x 3 sets throughout treatment session. Exercise 2: Prone scapular retraction 5 reps x 3 sets   Exercise 3: Supine PROM Cervical flexion, side bend and extension 10 reps with each one. Modalities  Moist heat: 15 minutes to the scapular region (Supine position)  E-stim (parameters): IFC x 15 minutes to the scapular region.  (Supine position)   Manual therapy  Joint mobilization: PA to the T4 - T6  (R) transverse processes (prone position)  Upper Thoracic Manipulation with arms crossed at 90 degrees (Seated position). Mid Cervical Locking Extension Technique 10 reps (supine position. Activity Tolerance  Patient tolerated treatment well    Assessment  Conditions Requiring Skilled Therapeutic Intervention  Body structures, Functions, Activity limitations: Decreased functional mobility ; Decreased ADL status; Decreased strength;Decreased ROM; Increased Pain  Treatment Diagnosis: Scapulothoracic Weakness      Plan  Times per week: 2-3  Plan weeks: 6-8  Current Treatment Recommendations: Strengthening, Home Exercise Program, Patient/Caregiver Education & Training, Modalities, Pain Management, Manual Therapy - Joint Manipulation    Therapy Time   Individual Concurrent Group Co-treatment   Time In 1625         Time Out 1725         Minutes 60           Treatment Charges: Minutes Units   []  Ultrasound     [x]  Electrical-Stim 15 1   []  Iontophoresis     []  Traction     []  Massage       []  Eval     []  Gait     [x]  Ther Exercise 15  1    [x]  Manual Therapy 15  1    []  Ther Activities       []  Aquatics     []  Vasopneumatic Device     []  Neuro Re-Ed       [x]  Other: Hot Pack 15  0    Total Treatment Time: 39 3      REJI FrancoT

## 2019-05-16 ENCOUNTER — HOSPITAL ENCOUNTER (OUTPATIENT)
Dept: PHYSICAL THERAPY | Age: 34
Setting detail: THERAPIES SERIES
Discharge: HOME | End: 2019-05-16
Payer: MEDICARE

## 2019-05-16 NOTE — PROGRESS NOTES
Physical Therapy  Daily Treatment Note/Cancellation  Date: 2019  Patient Name: Gabriella Meadows  MRN: [id number]     :   1985    General  Referring Practitioner: Basil Elam MD  PT Visit Information  Onset Date: 19  PT Insurance Information: [facility]   Total # of Visits Approved: 24  Total # of Visits to Date: 4  No Show: 0  Canceled Appointment: 1  General Comment  Comments: Pt cancelled his scheduled appointment due to illness.      Jose E Coles, PT DPT

## 2019-05-22 ENCOUNTER — HOSPITAL ENCOUNTER (OUTPATIENT)
Dept: PHYSICAL THERAPY | Age: 34
Setting detail: THERAPIES SERIES
Discharge: HOME | End: 2019-05-22
Payer: MEDICARE

## 2019-05-22 PROCEDURE — 97110 THERAPEUTIC EXERCISES: CPT

## 2019-05-22 PROCEDURE — G0283 ELEC STIM OTHER THAN WOUND: HCPCS

## 2019-05-22 ASSESSMENT — PAIN SCALES - GENERAL: PAINLEVEL_OUTOF10: 7

## 2019-05-22 ASSESSMENT — PAIN DESCRIPTION - ORIENTATION: ORIENTATION: RIGHT;LEFT

## 2019-05-22 ASSESSMENT — PAIN DESCRIPTION - FREQUENCY: FREQUENCY: CONTINUOUS

## 2019-05-22 ASSESSMENT — PAIN DESCRIPTION - ONSET: ONSET: GRADUAL

## 2019-05-22 ASSESSMENT — PAIN DESCRIPTION - PAIN TYPE: TYPE: CHRONIC PAIN

## 2019-05-22 ASSESSMENT — PAIN DESCRIPTION - PROGRESSION: CLINICAL_PROGRESSION: GRADUALLY WORSENING

## 2019-05-22 ASSESSMENT — PAIN DESCRIPTION - DESCRIPTORS: DESCRIPTORS: CONSTANT;ACHING;THROBBING

## 2019-05-22 ASSESSMENT — PAIN DESCRIPTION - LOCATION: LOCATION: BACK

## 2019-05-23 ASSESSMENT — PAIN DESCRIPTION - PAIN TYPE: TYPE: CHRONIC PAIN

## 2019-05-23 ASSESSMENT — PAIN DESCRIPTION - PROGRESSION: CLINICAL_PROGRESSION: GRADUALLY WORSENING

## 2019-05-23 ASSESSMENT — PAIN DESCRIPTION - ORIENTATION: ORIENTATION: LEFT;RIGHT

## 2019-05-23 ASSESSMENT — PAIN DESCRIPTION - ONSET: ONSET: GRADUAL

## 2019-05-23 ASSESSMENT — PAIN SCALES - GENERAL: PAINLEVEL_OUTOF10: 7

## 2019-05-23 ASSESSMENT — PAIN DESCRIPTION - FREQUENCY: FREQUENCY: CONTINUOUS

## 2019-05-23 ASSESSMENT — PAIN DESCRIPTION - DESCRIPTORS: DESCRIPTORS: CONSTANT;ACHING;THROBBING

## 2019-05-23 ASSESSMENT — PAIN DESCRIPTION - LOCATION: LOCATION: BACK

## 2019-05-23 NOTE — PROGRESS NOTES
Physical Therapy  Daily Treatment Note  Date: 2019  Patient Name: Hadley Francis  MRN: [id number]     :   1985    General  Chart Reviewed: Yes  Response To Previous Treatment: Not applicable  Family / Caregiver Present: No  Referring Practitioner: Duc Lockhart MD  PT Visit Information  Onset Date: 19  PT Insurance Information: [facility]   Total # of Visits Approved: 24  Total # of Visits to Date: 5  No Show: 0  Canceled Appointment: 1    Subjective  Pt stated that his pain has increased since his last treatment session. However, he was changing brakes and felt it may have \"flared\" his symptoms up. Pain Screening  Patient Currently in Pain: Yes  Pain Assessment  Pain Assessment: 0-10  Pain Level: 7  Patient's Stated Pain Goal: No pain  Pain Type: Chronic pain  Pain Location: Back  Pain Orientation: Left;Right(T4-T6)  Pain Descriptors: Constant; Aching; Throbbing  Pain Frequency: Continuous  Pain Onset: Gradual  Clinical Progression: Gradually worsening  Non-Pharmaceutical Pain Intervention(s): Repositioned;Rest;Heat applied;TENS  Response to Pain Intervention: Patient Satisfied  Multiple Pain Sites: No     Treatment Activities  Exercises  Exercise 1: Seated thoracic extension/(B) shoulder flexion 10 reps x 3 sets throughout treatment session. Exercise 2: Self-mobilization with towel for (R) cervical retraction 5 reps x 2 sets   Exercise 3: Supine PROM Cervical flexion, side bend and extension 10 reps with each one. Modalities  Moist heat: 15 minutes to the scapular region (Supine position)  E-stim (parameters): IFC x 15 minutes to the scapular region.  (Supine position)   Manual therapy  Joint mobilization: PA to the T4 - T6  (R) transverse processes (prone position)  Upper Thoracic Manipulation with arms crossed at 90 degrees (Seated position), Mechanical Locking Technique Non-Physiological for Cervical Extension on the (L)    Activity Tolerance  Patient tolerated treatment well    Assessment  Conditions Requiring Skilled Therapeutic Intervention  Body structures, Functions, Activity limitations: Decreased functional mobility ; Decreased ADL status; Decreased strength;Decreased ROM; Increased Pain  Assessment: Pt continues to demonstrate difficulty with (R) cervical rotation, tenderness still noted at the transverse processes of T4-T6 on the (R).   Treatment Diagnosis: Scapulothoracic Weakness  Prognosis: Good    Plan  Times per week: 2-3  Plan weeks: 6-8  Current Treatment Recommendations: Strengthening, Home Exercise Program, Patient/Caregiver Education & Training, Modalities, Pain Management, Manual Therapy - Joint Manipulation    Therapy Time   Individual Concurrent Group Co-treatment   Time In 8290         Time Out 8433         Minutes 60           Treatment Charges: Minutes Units   []  Ultrasound     [x]  Electrical-Stim 15 1   []  Iontophoresis     []  Traction     []  Massage       []  Eval     []  Gait     [x]  Ther Exercise 30  2    []  Manual Therapy       []  Ther Activities       []  Aquatics     []  Vasopneumatic Device     []  Neuro Re-Ed       [x]  Other: Hot Pack  15  0    Total Treatment Time: 39 3      Kathy Martinez PT DPT

## 2019-05-24 ENCOUNTER — HOSPITAL ENCOUNTER (OUTPATIENT)
Dept: PHYSICAL THERAPY | Age: 34
Setting detail: THERAPIES SERIES
Discharge: HOME | End: 2019-05-24
Payer: MEDICARE

## 2019-05-29 ENCOUNTER — APPOINTMENT (OUTPATIENT)
Dept: PHYSICAL THERAPY | Age: 34
End: 2019-05-29
Payer: MEDICARE

## 2019-05-31 ENCOUNTER — HOSPITAL ENCOUNTER (OUTPATIENT)
Dept: PHYSICAL THERAPY | Age: 34
Setting detail: THERAPIES SERIES
Discharge: HOME | End: 2019-05-31
Payer: MEDICARE

## 2019-05-31 PROCEDURE — 97140 MANUAL THERAPY 1/> REGIONS: CPT

## 2019-05-31 PROCEDURE — G0283 ELEC STIM OTHER THAN WOUND: HCPCS

## 2019-05-31 ASSESSMENT — PAIN DESCRIPTION - ORIENTATION: ORIENTATION: RIGHT;LEFT

## 2019-05-31 ASSESSMENT — PAIN DESCRIPTION - ONSET: ONSET: GRADUAL

## 2019-05-31 ASSESSMENT — PAIN DESCRIPTION - PAIN TYPE: TYPE: CHRONIC PAIN

## 2019-05-31 ASSESSMENT — PAIN DESCRIPTION - PROGRESSION: CLINICAL_PROGRESSION: NOT CHANGED

## 2019-05-31 ASSESSMENT — PAIN DESCRIPTION - LOCATION: LOCATION: BACK

## 2019-05-31 ASSESSMENT — PAIN SCALES - GENERAL: PAINLEVEL_OUTOF10: 7

## 2019-05-31 ASSESSMENT — PAIN DESCRIPTION - DESCRIPTORS: DESCRIPTORS: CONSTANT;ACHING;THROBBING

## 2019-05-31 NOTE — PROGRESS NOTES
Physical Therapy  Daily Treatment Note  Date: 2019  Patient Name: Hieu Arora  MRN: [id number]     :   1985    General  Chart Reviewed: Yes  Response To Previous Treatment: Not applicable  Family / Caregiver Present: No  Referring Practitioner: Jose Ngo MD  PT Visit Information  Onset Date: 19  PT Insurance Information: [facility]   Total # of Visits Approved: 24  Total # of Visits to Date: 6  No Show: 0  Canceled Appointment: 2    Subjective  Pain Screening  Patient Currently in Pain: Yes  Pain Assessment  Pain Assessment: 0-10  Pain Level: 7  Patient's Stated Pain Goal: No pain  Pain Type: Chronic pain  Pain Location: Back  Pain Orientation: Right;Left(T4-T6)  Pain Descriptors: Constant; Aching; Throbbing  Pain Onset: Gradual  Clinical Progression: Not changed  Non-Pharmaceutical Pain Intervention(s): Repositioned;Rest;TENS;Heat applied  Response to Pain Intervention: Patient Satisfied  Multiple Pain Sites: No    Treatment Activities  Exercises  Exercise 1: Seated thoracic extension/(B) shoulder flexion 10 reps x 3 sets throughout treatment session. Exercise 3: Supine PROM Cervical flexion, side bend and extension 10 reps with each one. Modalities  Moist heat: 15 minutes to the scapular region (Supine position)  E-stim (parameters): IFC x 15 minutes to the scapular region. (Supine position)   Manual therapy  Joint mobilization: PA to the T4 - T6  (L) transverse processes (prone position)  Head nods with posterior glide    Soft Tissue Mobilization: (L) medial border of the scapula, scapular assistance with upward rotation and end range over pressure. Activity Tolerance  Patient tolerated treatment well    Assessment  Conditions Requiring Skilled Therapeutic Intervention  Body structures, Functions, Activity limitations: Decreased functional mobility ; Decreased ADL status; Decreased strength;Decreased ROM; Increased Pain  Assessment: Performed several mobilization techniques to the (L) T4-T6 vertebrae along with (L) scapula. Pt demonstrated full ROM within all planes of the neck and upper thoracic region. However, pain still increases with (L) thoracic extension and abduction.     Treatment Diagnosis: Scapulothoracic Weakness  Prognosis: Good    Plan  Times per week: 2-3  Plan weeks: 6-8  Current Treatment Recommendations: Strengthening, Home Exercise Program, Patient/Caregiver Education & Training, Modalities, Pain Management, Manual Therapy - Joint Manipulation    Therapy Time   Individual Concurrent Group Co-treatment   Time In 0628         Time Out 1700         Minutes 45           Treatment Charges: Minutes Units   []  Ultrasound     [x]  Electrical-Stim 15 1   []  Iontophoresis     []  Traction     []  Massage       []  Eval     []  Gait     []  Ther Exercise       [x]  Manual Therapy 30  2    []  Ther Activities       []  Aquatics     []  Vasopneumatic Device     []  Neuro Re-Ed       [x]  Other: Hot Pack  15  0    Total Treatment Time: 39 3      Eriberto Banerjee, PT DPT

## 2019-06-07 ENCOUNTER — HOSPITAL ENCOUNTER (OUTPATIENT)
Dept: PHYSICAL THERAPY | Age: 34
Setting detail: THERAPIES SERIES
Discharge: HOME | End: 2019-06-07
Payer: MEDICARE

## 2019-06-07 PROCEDURE — 97110 THERAPEUTIC EXERCISES: CPT

## 2019-06-07 ASSESSMENT — PAIN DESCRIPTION - ORIENTATION: ORIENTATION: LEFT;RIGHT

## 2019-06-07 ASSESSMENT — PAIN SCALES - GENERAL: PAINLEVEL_OUTOF10: 7

## 2019-06-07 ASSESSMENT — PAIN DESCRIPTION - FREQUENCY: FREQUENCY: CONTINUOUS

## 2019-06-07 ASSESSMENT — PAIN DESCRIPTION - PAIN TYPE: TYPE: CHRONIC PAIN

## 2019-06-07 ASSESSMENT — PAIN DESCRIPTION - ONSET: ONSET: GRADUAL

## 2019-06-07 ASSESSMENT — PAIN DESCRIPTION - LOCATION: LOCATION: BACK

## 2019-06-07 ASSESSMENT — PAIN DESCRIPTION - DESCRIPTORS: DESCRIPTORS: CONSTANT;ACHING;THROBBING

## 2019-06-07 ASSESSMENT — PAIN DESCRIPTION - PROGRESSION: CLINICAL_PROGRESSION: NOT CHANGED

## 2019-06-07 NOTE — PROGRESS NOTES
Physical Therapy  Daily Treatment Note  Date: 2019  Patient Name: Rekha Bar  MRN: [id number]     :   1985    General  Chart Reviewed: Yes  Response To Previous Treatment: Not applicable  Family / Caregiver Present: No  Referring Practitioner: Virginie Randle MD  PT Visit Information  Onset Date: 19  PT Insurance Information: \[facility]\""   Total # of Visits Approved: 24  Total # of Visits to Date: 7  No Show: 0  Canceled Appointment: 2    Subjective  Pain Screening  Patient Currently in Pain: Yes  Pain Assessment  Pain Assessment: 0-10  Pain Level: 7  Patient's Stated Pain Goal: No pain  Pain Type: Chronic pain  Pain Location: Back  Pain Orientation: Left;Right(T4-T6)  Pain Descriptors: Constant; Aching; Throbbing  Pain Frequency: Continuous  Pain Onset: Gradual  Clinical Progression: Not changed  Non-Pharmaceutical Pain Intervention(s): Repositioned;Rest;TENS;Heat applied  Response to Pain Intervention: Patient Satisfied  Multiple Pain Sites: No     Treatment Activities  Exercises  Exercise 3: Supine Head Nods 10 sec hold x 10 reps   Exercise 4: Supine (B) side bend isometric 10 sec hold x 10 reps with each side  Exercise 5: Supine neck extension isometric 10 sec x 20 reps   Exercise 6: Supine (R) horizontal adduction 10 reps with each arm     Modalities  Moist heat: 15 minutes to the scapular region (Supine position)    Activity Tolerance  Patient limited by pain     Assessment  Conditions Requiring Skilled Therapeutic Intervention  Body structures, Functions, Activity limitations: Decreased functional mobility ; Decreased ADL status; Decreased strength;Decreased ROM; Increased Pain  Assessment: Attempted to initiate a strengthening program for the neck/upper thoracic region - unable to actively move the neck in all plane without an immediate increase in pain. Able to passively move the neck and UEs in all planes without difficulty /except end range (L) shoulder flexion -increased pain noted.

## 2019-06-12 ENCOUNTER — HOSPITAL ENCOUNTER (OUTPATIENT)
Dept: PHYSICAL THERAPY | Age: 34
Setting detail: THERAPIES SERIES
Discharge: HOME | End: 2019-06-12
Payer: MEDICARE

## 2019-06-12 PROCEDURE — 97110 THERAPEUTIC EXERCISES: CPT

## 2019-06-12 ASSESSMENT — PAIN DESCRIPTION - ORIENTATION: ORIENTATION: LEFT

## 2019-06-12 ASSESSMENT — PAIN DESCRIPTION - PROGRESSION: CLINICAL_PROGRESSION: NOT CHANGED

## 2019-06-12 ASSESSMENT — PAIN SCALES - GENERAL: PAINLEVEL_OUTOF10: 7

## 2019-06-12 ASSESSMENT — PAIN DESCRIPTION - DESCRIPTORS: DESCRIPTORS: CONSTANT;ACHING;THROBBING

## 2019-06-12 ASSESSMENT — PAIN DESCRIPTION - ONSET: ONSET: GRADUAL

## 2019-06-12 ASSESSMENT — PAIN DESCRIPTION - FREQUENCY: FREQUENCY: CONTINUOUS

## 2019-06-12 ASSESSMENT — PAIN DESCRIPTION - LOCATION: LOCATION: BACK

## 2019-06-12 ASSESSMENT — PAIN DESCRIPTION - PAIN TYPE: TYPE: CHRONIC PAIN

## 2019-06-13 ENCOUNTER — HOSPITAL ENCOUNTER (OUTPATIENT)
Dept: PHYSICAL THERAPY | Age: 34
Setting detail: THERAPIES SERIES
Discharge: HOME | End: 2019-06-13
Payer: MEDICARE

## 2019-06-13 PROCEDURE — 97110 THERAPEUTIC EXERCISES: CPT

## 2019-06-13 ASSESSMENT — PAIN DESCRIPTION - FREQUENCY
FREQUENCY: CONTINUOUS
FREQUENCY: CONTINUOUS

## 2019-06-13 ASSESSMENT — PAIN DESCRIPTION - ORIENTATION
ORIENTATION: LEFT
ORIENTATION: LEFT

## 2019-06-13 ASSESSMENT — PAIN DESCRIPTION - ONSET
ONSET: GRADUAL
ONSET: GRADUAL

## 2019-06-13 ASSESSMENT — PAIN SCALES - GENERAL
PAINLEVEL_OUTOF10: 6
PAINLEVEL_OUTOF10: 7

## 2019-06-13 ASSESSMENT — PAIN DESCRIPTION - PROGRESSION
CLINICAL_PROGRESSION: NOT CHANGED
CLINICAL_PROGRESSION: GRADUALLY IMPROVING

## 2019-06-13 ASSESSMENT — PAIN DESCRIPTION - PAIN TYPE
TYPE: CHRONIC PAIN
TYPE: CHRONIC PAIN

## 2019-06-13 ASSESSMENT — PAIN DESCRIPTION - DESCRIPTORS
DESCRIPTORS: CONSTANT;ACHING;THROBBING
DESCRIPTORS: CONSTANT;ACHING;THROBBING

## 2019-06-13 ASSESSMENT — PAIN DESCRIPTION - LOCATION
LOCATION: BACK
LOCATION: BACK

## 2019-06-13 NOTE — PROGRESS NOTES
Physical Therapy  Daily Treatment Note  Date: 2019  Patient Name: Thomas Vazquez  MRN: [id number]     :   1985    General  Chart Reviewed: Yes  Response To Previous Treatment: Not applicable  Family / Caregiver Present: No  Referring Practitioner: Brianna Luna MD  PT Visit Information  Onset Date: 19  PT Insurance Information: [facility]   Total # of Visits Approved: 24  Total # of Visits to Date: 8  No Show: 0  Canceled Appointment: 2    Subjective  Symptoms relatively unchanged from his previous treatment session. Pain Screening  Patient Currently in Pain: Yes  Pain Assessment  Pain Assessment: 0-10  Pain Level: 7  Patient's Stated Pain Goal: No pain  Pain Type: Chronic pain  Pain Location: Back  Pain Orientation: Left(T4-T6)  Pain Descriptors: Constant; Aching; Throbbing  Pain Frequency: Continuous  Pain Onset: Gradual  Clinical Progression: Not changed  Non-Pharmaceutical Pain Intervention(s): Repositioned;Rest;Heat applied  Response to Pain Intervention: Patient Satisfied  Multiple Pain Sites: No     Treatment Activities  Exercises  Exercise 1: Supine head nod 5 reps   Exercise 2: Seated cervical flexion/extension 5 reps   Exercise 3: Seated cervical side bend 5 reps with each side  Exercise 4: Self mobilization with towel/cervical rotation 5 reps x 2 sets with each side   Exercise 5: Supine horizontal adduction 5 reps AAROM on the (L)   Exercise 6: Seated scapular retraction 5 reps   Exercise 7: Supine depression 5 reps   Exercise 8: Supine protraction 10 reps   Manual therapy  Joint mobilization: PA to the T4 - T6  (L) transverse processes (prone position)  Head nods with posterior glide  Mechanical locking technique 3-5 reps with each side, PA mobs to the spinous processes   Modalities  Hot pack x 15 minutes to the cervical region (supine position)    Activity Tolerance  Patient tolerated treatment well    Assessment  Conditions Requiring Skilled Therapeutic Intervention  Body structures, Functions, Activity limitations: Decreased functional mobility ; Decreased ADL status; Decreased strength;Decreased ROM; Increased Pain  Assessment: Pt demonstrated normal PROM with cervical rotation and AROM with side bending in both directions. Flexion/extension still limited by pain (Total 70 degrees) Thoracic extension - WNL, thoracic abduction limited (R) worse than the (L).    Treatment Diagnosis: Scapulothoracic Weakness  Prognosis: Good    Plan  Times per week: 2-3  Plan weeks: 6-8  Current Treatment Recommendations: Strengthening, Home Exercise Program, Patient/Caregiver Education & Training, Modalities, Pain Management, Manual Therapy - Joint Manipulation    Therapy Time   Individual Concurrent Group Co-treatment   Time In 1625         Time Out 5364         Minutes 50           Treatment Charges: Minutes Units   []  Ultrasound     []  Electrical-Stim     []  Iontophoresis     []  Traction     []  Massage       []  Eval     []  Gait     [x]  Ther Exercise 40 3    []  Manual Therapy       []  Ther Activities       []  Aquatics     []  Vasopneumatic Device     []  Neuro Re-Ed       [x]  Other: Hot Pack 10  0    Total Treatment Time: 48  3     Luke Washington PT DPT

## 2019-06-20 ENCOUNTER — HOSPITAL ENCOUNTER (OUTPATIENT)
Dept: PHYSICAL THERAPY | Age: 34
Setting detail: THERAPIES SERIES
Discharge: HOME | End: 2019-06-20
Payer: MEDICARE

## 2019-06-20 PROCEDURE — 97110 THERAPEUTIC EXERCISES: CPT

## 2019-06-20 ASSESSMENT — PAIN DESCRIPTION - PAIN TYPE: TYPE: CHRONIC PAIN

## 2019-06-20 ASSESSMENT — PAIN DESCRIPTION - ORIENTATION: ORIENTATION: LEFT

## 2019-06-20 ASSESSMENT — PAIN DESCRIPTION - ONSET: ONSET: GRADUAL

## 2019-06-20 ASSESSMENT — PAIN DESCRIPTION - FREQUENCY: FREQUENCY: CONTINUOUS

## 2019-06-20 ASSESSMENT — PAIN SCALES - GENERAL: PAINLEVEL_OUTOF10: 6

## 2019-06-20 ASSESSMENT — PAIN DESCRIPTION - DESCRIPTORS: DESCRIPTORS: CONSTANT;ACHING;THROBBING

## 2019-06-20 ASSESSMENT — PAIN DESCRIPTION - LOCATION: LOCATION: BACK

## 2019-06-20 ASSESSMENT — PAIN DESCRIPTION - PROGRESSION: CLINICAL_PROGRESSION: NOT CHANGED

## 2019-06-20 NOTE — PROGRESS NOTES
Physical Therapy  Daily Treatment Note  Date: 2019  Patient Name: Sofia Favre  MRN: [id number]     :   1985    General  Chart Reviewed: Yes  Response To Previous Treatment: Not applicable  Family / Caregiver Present: No  Referring Practitioner: Contreras Spence MD  PT Visit Information  Onset Date: 19  PT Insurance Information: [facility]   Total # of Visits Approved: 24  Total # of Visits to Date: 10  No Show: 0  Canceled Appointment: 2    Subjective  Pain Screening  Patient Currently in Pain: Yes  Pain Assessment  Pain Assessment: 0-10  Pain Level: 6  Patient's Stated Pain Goal: No pain  Pain Type: Chronic pain  Pain Location: Back  Pain Orientation: Left(T4-T6)  Pain Descriptors: Constant; Aching; Throbbing  Pain Frequency: Continuous  Pain Onset: Gradual  Clinical Progression: Not changed  Non-Pharmaceutical Pain Intervention(s): Repositioned;Rest;Heat applied  Response to Pain Intervention: Patient Satisfied  Multiple Pain Sites: No     Treatment Activities  Exercises  Exercise 1: Seated Cervical Flexion/Extension 10 reps   Exercise 2: Seated (Chin to Chest) Rotation 10 reps   Exercise 3: Prone (B) shoulder extension 10 reps   Exercise 4: Prone (B) shoulder horizontal abduction 10 reps   Exercise 5: Seated thoracic extension with a wand 5 reps x 2 sets  Exercise 6: Seated thoracic abduction with a wand 5 reps   Manual therapy  Joint mobilization: Head nods with posterior glide, (+) prone instability noted within the upper thoracic region/able to stabilize - improved thoracic motions noted. Muscle energy: Supine cervical rotation with full flexion/contract/relax 30 sec hold x 3 reps with each side      Activity Tolerance  Patient tolerated treatment well     Assessment  Conditions Requiring Skilled Therapeutic Intervention  Body structures, Functions, Activity limitations: Decreased functional mobility ; Decreased ADL status; Decreased strength;Decreased ROM; Increased Pain  Assessment: Less tightness was noted throughout the cervical and upper thoracic region. (+) prone instability was noted. Patient declined E-stim today/used it at home in the last several days and felt it made him feel worse. Treatment Diagnosis: Scapulothoracic Weakness  Prognosis: Good  Patient Education: Patient was educated on performing thoracic extension, abduction and rotation with his spouse stabilizing his upper thoracic region.  (Daily 1-2 sessions 5-10 reps with each one)        Plan  Times per week: 2-3  Plan weeks: 6-8  Current Treatment Recommendations: Strengthening, Home Exercise Program, Patient/Caregiver Education & Training, Modalities, Pain Management, Manual Therapy - Joint Manipulation    Therapy Time   Individual Concurrent Group Co-treatment   Time In 4031         Time Out 1700         Minutes 45           Treatment Charges: Minutes Units   []  Ultrasound     []  Electrical-Stim     []  Iontophoresis     []  Traction     []  Massage       []  Eval     []  Gait     [x]  Ther Exercise 45 3    []  Manual Therapy       []  Ther Activities       []  Aquatics     []  Vasopneumatic Device     []  Neuro Re-Ed       []  Other       Total Treatment Time: 39 3      Gonsalo Reilly, PT  DPT

## 2019-06-27 ENCOUNTER — APPOINTMENT (OUTPATIENT)
Dept: PHYSICAL THERAPY | Age: 34
End: 2019-06-27
Payer: MEDICARE

## 2019-07-02 ENCOUNTER — OFFICE VISIT (OUTPATIENT)
Dept: ORTHOPEDIC SURGERY | Age: 34
End: 2019-07-02
Payer: MEDICARE

## 2019-07-02 VITALS — WEIGHT: 175 LBS | HEIGHT: 68 IN | BODY MASS INDEX: 26.52 KG/M2

## 2019-07-02 DIAGNOSIS — M54.2 NECK PAIN: Primary | ICD-10-CM

## 2019-07-02 PROCEDURE — 99212 OFFICE O/P EST SF 10 MIN: CPT | Performed by: ORTHOPAEDIC SURGERY

## 2019-07-02 PROCEDURE — 4004F PT TOBACCO SCREEN RCVD TLK: CPT | Performed by: ORTHOPAEDIC SURGERY

## 2019-07-02 PROCEDURE — G8419 CALC BMI OUT NRM PARAM NOF/U: HCPCS | Performed by: ORTHOPAEDIC SURGERY

## 2019-07-02 PROCEDURE — G8427 DOCREV CUR MEDS BY ELIG CLIN: HCPCS | Performed by: ORTHOPAEDIC SURGERY

## 2019-07-03 ENCOUNTER — HOSPITAL ENCOUNTER (OUTPATIENT)
Dept: PHYSICAL THERAPY | Age: 34
Setting detail: THERAPIES SERIES
Discharge: HOME | End: 2019-07-03
Payer: MEDICARE

## 2019-07-08 ENCOUNTER — HOSPITAL ENCOUNTER (OUTPATIENT)
Dept: PHYSICAL THERAPY | Age: 34
Setting detail: THERAPIES SERIES
Discharge: HOME | End: 2019-07-08
Payer: MEDICARE

## 2019-07-08 NOTE — PROGRESS NOTES
Physical Therapy  Daily Treatment Note/No show  Date: 2019  Patient Name: Rebecca Perez  MRN: [id number]     :   1985    General   Referring Practitioner: Hipolito Coughlin MD  PT Visit Information  Onset Date: 19  PT Insurance Information: [facility]   Total # of Visits Approved: 24  Total # of Visits to Date: 10  No Show: 1  Canceled Appointment: 2  General Comment  Comments: Pt \"no-showed\" for his scheduled appointment.      Kae Monae, PT DPT

## 2019-07-17 ENCOUNTER — TELEPHONE (OUTPATIENT)
Dept: ORTHOPEDIC SURGERY | Age: 34
End: 2019-07-17

## 2019-07-25 ENCOUNTER — HOSPITAL ENCOUNTER (OUTPATIENT)
Dept: MRI IMAGING | Facility: CLINIC | Age: 34
Discharge: HOME | End: 2019-07-27
Payer: MEDICARE

## 2019-07-25 DIAGNOSIS — M54.2 NECK PAIN: ICD-10-CM

## 2019-07-25 PROCEDURE — 72141 MRI NECK SPINE W/O DYE: CPT

## 2019-07-29 ENCOUNTER — TELEPHONE (OUTPATIENT)
Dept: ADMINISTRATIVE | Age: 34
End: 2019-07-29

## 2019-08-13 ENCOUNTER — OFFICE VISIT (OUTPATIENT)
Dept: ORTHOPEDIC SURGERY | Age: 34
End: 2019-08-13
Payer: MEDICARE

## 2019-08-13 DIAGNOSIS — M54.2 NECK PAIN: Primary | ICD-10-CM

## 2019-08-13 DIAGNOSIS — G95.0 SYRINX OF SPINAL CORD (HCC): ICD-10-CM

## 2019-08-13 DIAGNOSIS — M25.512 ACUTE PAIN OF LEFT SHOULDER: ICD-10-CM

## 2019-08-13 DIAGNOSIS — G25.89 SCAPULAR DYSKINESIS: ICD-10-CM

## 2019-08-13 PROCEDURE — G8427 DOCREV CUR MEDS BY ELIG CLIN: HCPCS | Performed by: ORTHOPAEDIC SURGERY

## 2019-08-13 PROCEDURE — 4004F PT TOBACCO SCREEN RCVD TLK: CPT | Performed by: ORTHOPAEDIC SURGERY

## 2019-08-13 PROCEDURE — 20610 DRAIN/INJ JOINT/BURSA W/O US: CPT | Performed by: ORTHOPAEDIC SURGERY

## 2019-08-13 PROCEDURE — 99213 OFFICE O/P EST LOW 20 MIN: CPT | Performed by: ORTHOPAEDIC SURGERY

## 2019-08-13 PROCEDURE — G8419 CALC BMI OUT NRM PARAM NOF/U: HCPCS | Performed by: ORTHOPAEDIC SURGERY

## 2019-08-13 RX ORDER — LIDOCAINE HYDROCHLORIDE 10 MG/ML
2 INJECTION, SOLUTION EPIDURAL; INFILTRATION; INTRACAUDAL; PERINEURAL ONCE
Status: COMPLETED | OUTPATIENT
Start: 2019-08-13 | End: 2019-08-13

## 2019-08-13 RX ORDER — BUPIVACAINE HYDROCHLORIDE 5 MG/ML
2 INJECTION, SOLUTION PERINEURAL ONCE
Status: COMPLETED | OUTPATIENT
Start: 2019-08-13 | End: 2019-08-13

## 2019-08-13 RX ORDER — BETAMETHASONE SODIUM PHOSPHATE AND BETAMETHASONE ACETATE 3; 3 MG/ML; MG/ML
12 INJECTION, SUSPENSION INTRA-ARTICULAR; INTRALESIONAL; INTRAMUSCULAR; SOFT TISSUE ONCE
Status: COMPLETED | OUTPATIENT
Start: 2019-08-13 | End: 2019-08-13

## 2019-08-13 RX ADMIN — LIDOCAINE HYDROCHLORIDE 2 ML: 10 INJECTION, SOLUTION EPIDURAL; INFILTRATION; INTRACAUDAL; PERINEURAL at 16:32

## 2019-08-13 RX ADMIN — BUPIVACAINE HYDROCHLORIDE 10 MG: 5 INJECTION, SOLUTION PERINEURAL at 16:32

## 2019-08-13 RX ADMIN — BETAMETHASONE SODIUM PHOSPHATE AND BETAMETHASONE ACETATE 12 MG: 3; 3 INJECTION, SUSPENSION INTRA-ARTICULAR; INTRALESIONAL; INTRAMUSCULAR; SOFT TISSUE at 16:31

## 2019-08-13 NOTE — PROGRESS NOTES
Patient ID: Nikolas Munroe is a 34 y.o. male. Chief Complaint   Patient presents with    Follow-up     cervical         HPI     Please refer to previous clinic notes by Dr. Dena Carrero    Patient is referred to me post MRI cervical spine    Patient with long history of left periscapular pain    Patient with failure of physical therapy        Past Medical History:   Diagnosis Date    Anxiety     Depression     Hyperlipidemia     Hyperlipidemia 10/9/2018    Panic attacks      Past Surgical History:   Procedure Laterality Date    EXPLORATION OF UNDESCENDED TESTICLE Right     TONSILLECTOMY       Family History   Problem Relation Age of Onset    No Known Problems Mother     No Known Problems Father     No Known Problems Sister        Review of Systems   All other systems reviewed and are negative. Physical Exam   Constitutional: He is oriented to person, place, and time. He appears well-developed and well-nourished. HENT:   Head: Normocephalic and atraumatic. Eyes: Conjunctivae and EOM are normal.   Neck: Normal range of motion. Pulmonary/Chest: Effort normal. No respiratory distress. Neurological: He is alert and oriented to person, place, and time. He has normal strength. No sensory deficit. Normal gait   Skin: Skin is warm and dry. Psychiatric: His behavior is normal. Thought content normal.   Nursing note and vitals reviewed. Physical exam is a little bit unusual     Cervical range of motion is significantly limited flexion and/or extension at about 50% of normal aggravates left periscapular pain    Shoulder demonstrates normal range of motion no snapping of the scapula patient is tender predominantly about the superior medial border of the periscapular region on the left    MRI cervical spine is reviewed very normal except for a very small syrinx behind C6 and C7    Assessment:          1. Neck pain    2. Scapular dyskinesis    3. Acute pain of left shoulder    4.  Syrinx of lidocaine PF 1 % injection 2 mL     Admin Date  08/13/2019  16:32 Action  Given Dose  2 mL Route  Intradermal Site  Shoulder Left Administered By  Nilsa Dunn    Ordering Provider:  America Long MD    NDC:  4821-1689-50    Lot#:  [id number]    :  Usha Posadas    Patient Supplied?:  No                  America Long MD    Please note that this chart was generated using voicerecognition Dragon dictation software. Although every effort was made to ensurethe accuracy of this automated transcription, some errors in transcription may haveoccurred.

## 2019-08-21 ENCOUNTER — TELEPHONE (OUTPATIENT)
Dept: ADMINISTRATIVE | Age: 34
End: 2019-08-21

## 2019-08-21 NOTE — TELEPHONE ENCOUNTER
Patient called stated he can not be seen for quite some time at [facility] ([facility] Neurosurgery.  Would like referral to [facility]

## 2019-08-22 ENCOUNTER — TELEPHONE (OUTPATIENT)
Dept: ADMINISTRATIVE | Age: 34
End: 2019-08-22

## 2019-12-13 ENCOUNTER — OFFICE VISIT (OUTPATIENT)
Dept: FAMILY MEDICINE CLINIC | Age: 34
End: 2019-12-13
Payer: MEDICARE

## 2019-12-13 VITALS
BODY MASS INDEX: 27.98 KG/M2 | TEMPERATURE: 98.9 F | WEIGHT: 184 LBS | OXYGEN SATURATION: 100 % | HEART RATE: 99 BPM | DIASTOLIC BLOOD PRESSURE: 72 MMHG | SYSTOLIC BLOOD PRESSURE: 122 MMHG

## 2019-12-13 DIAGNOSIS — J06.9 VIRAL URI: Primary | ICD-10-CM

## 2019-12-13 DIAGNOSIS — M79.10 MYALGIA: ICD-10-CM

## 2019-12-13 LAB
INFLUENZA A ANTIBODY: NORMAL
INFLUENZA B ANTIBODY: NORMAL

## 2019-12-13 PROCEDURE — 99213 OFFICE O/P EST LOW 20 MIN: CPT | Performed by: FAMILY MEDICINE

## 2019-12-13 PROCEDURE — 87804 INFLUENZA ASSAY W/OPTIC: CPT | Performed by: FAMILY MEDICINE

## 2019-12-13 RX ORDER — RISPERIDONE 0.5 MG/1
TABLET, FILM COATED ORAL
Refills: 2 | COMMUNITY
Start: 2019-11-20 | End: 2022-07-11

## 2019-12-13 ASSESSMENT — ENCOUNTER SYMPTOMS
SINUS PRESSURE: 0
VOMITING: 0
CHANGE IN BOWEL HABIT: 0
SINUS PAIN: 0
FLU SYMPTOMS: 1
COUGH: 0
SORE THROAT: 1
NAUSEA: 0
TROUBLE SWALLOWING: 0
RHINORRHEA: 0

## 2022-08-01 ENCOUNTER — OFFICE VISIT (OUTPATIENT)
Dept: ORTHOPEDIC SURGERY | Age: 37
End: 2022-08-01
Payer: MEDICARE

## 2022-08-01 VITALS — HEIGHT: 68 IN | RESPIRATION RATE: 12 BRPM | BODY MASS INDEX: 28.64 KG/M2 | WEIGHT: 189 LBS

## 2022-08-01 DIAGNOSIS — M25.511 RIGHT SHOULDER PAIN, UNSPECIFIED CHRONICITY: ICD-10-CM

## 2022-08-01 DIAGNOSIS — M89.8X1 PAIN OF RIGHT SCAPULA: Primary | ICD-10-CM

## 2022-08-01 PROCEDURE — 4004F PT TOBACCO SCREEN RCVD TLK: CPT | Performed by: ORTHOPAEDIC SURGERY

## 2022-08-01 PROCEDURE — G8427 DOCREV CUR MEDS BY ELIG CLIN: HCPCS | Performed by: ORTHOPAEDIC SURGERY

## 2022-08-01 PROCEDURE — 99213 OFFICE O/P EST LOW 20 MIN: CPT | Performed by: ORTHOPAEDIC SURGERY

## 2022-08-01 PROCEDURE — G8419 CALC BMI OUT NRM PARAM NOF/U: HCPCS | Performed by: ORTHOPAEDIC SURGERY

## 2022-08-01 SDOH — HEALTH STABILITY: PHYSICAL HEALTH: ON AVERAGE, HOW MANY MINUTES DO YOU ENGAGE IN EXERCISE AT THIS LEVEL?: 20 MIN

## 2022-08-01 SDOH — HEALTH STABILITY: PHYSICAL HEALTH: ON AVERAGE, HOW MANY DAYS PER WEEK DO YOU ENGAGE IN MODERATE TO STRENUOUS EXERCISE (LIKE A BRISK WALK)?: 2 DAYS

## 2022-08-01 NOTE — PROGRESS NOTES
Orthopedic Shoulder Encounter Note     Chief complaint: right shoulder pain    HPI: Debbie Mobley is a 31 y.o.  right-hand dominant male who presents for evaluation of his right shoulder. He was seen in the past for pain in the left shoulder and he states that the pain he is experiencing on the right side is exactly the same as he was experiencing on the left. Essentially the pain in the left shoulder switch sides to the right. He describes a constant pain extending from the right side of his neck and down across the medial border of his right shoulder blade to the bottom of the shoulder blade. It is exacerbated by use. He has intermittent numbness and tingling typically down his arm into the index and middle fingers as well as at times into the anterior chest wall. He denies having any weakness or stiffness. Of note when he had similar pain in the left side he failed attempts at conservative management and ultimately was referred to Dr. Monae Issa who then sent him on to neurosurgery as a result of what appeared to be a syrinx on his cervical spine MRI. A CT myelogram was completed per the patient and this was normal and so he was referred to pain management by neurosurgery. Previous treatment:    NSAIDs: Ibuprofen 800, naproxen    Physical Therapy: No    Injections: Left shoulder subscapular cortisone injection by Dr. Monae Issa    Surgeries: None    Review of Systems:   Constitutional: Negative for fever, chills, sweats. Pain Score:   6  Neurological: Negative for headache, numbness, or weakness. Musculoskeletal: As noted in HPI     Past Medical History  Shanelle Avendano  has a past medical history of Anxiety, Depression, Hyperlipidemia, Hyperlipidemia, and Panic attacks. Past Surgical History  Shanelle Avendano  has a past surgical history that includes Tonsillectomy and Exploration of undescended testicle (Right).     Current Medications  Current Outpatient Medications   Medication Sig Dispense Refill    lamoTRIgine (LAMICTAL) 100 MG tablet Take 100 mg by mouth daily      ibuprofen (ADVIL;MOTRIN) 800 MG tablet Take 1 tablet by mouth 2 times daily as needed for Pain 60 tablet 1    lidocaine (LIDODERM) 5 % Place 1 patch onto the skin daily 12 hours on, 12 hours off. 30 patch 0    venlafaxine (EFFEXOR XR) 150 MG extended release capsule Take 75 mg by mouth daily        No current facility-administered medications for this visit. Allergies  Allergies have been reviewed. Dustin Mcneil has No Known Allergies. Social History  Dustin Mcneil  reports that he has been smoking cigarettes. He has been smoking an average of .5 packs per day. He has never used smokeless tobacco. He reports that he does not drink alcohol and does not use drugs. Family History  Tal's family history includes No Known Problems in his father, mother, and sister. Physical Exam:     Resp 12   Ht 5' 8\" (1.727 m)   Wt 189 lb (85.7 kg)   BMI 28.74 kg/m²    Constitutional: Patient is oriented to person, place, and time. Patient appears well-developed and well nourished. Mental Status/psychiatric: Behavior is normal. Thought content normal.  HENT: Negative otherwise noted  Head: Normocephalic and Atraumatic  Nose: Normal  Respiratory/Cardio: Effort normal. No respiratory distress. Neck: Normal range of motion Neck supple. Shoulder:    Skin: Skin is warm and dry; no swelling or obvious muscular atrophy.    Vasculature: 2+ radial pulses bilaterally  Neuro: Sensation grossly intact to light touch diffusely  Tenderness: Nontender to palpation    ROM: (Degrees)    Right   A P   Left   A P    Elevation  150 155   Elevation  150   Abduction  150 150   Abduction  130    ER   85 90   ER   85   IR   T10    IR   T10   90 abd/ER      90 abd/ER     90 abd/IR      90 abd/IR     Crepitation  No    Crepitation No  Dyskenesia  No    Dyskenesia No      Muscle strength:    Right       Left    Deltoid   5    Deltoid   5  Supraspinatus  5    Supraspinatus  5  ER   5    ER   5  IR   5    IR   5    Special tests    Right   Rotator Cuff    Left    n   Painful arc    n   n   Pain with ER    n    y   Neer's     n    n   Hawkin's    n    n   Drop Arm    n  n   Lift off/Belly Press   n  n   ER Lag    n          AC Joint  n   AC tenderness   n  n   Cross-chest adduction  n       Labrum/biceps    n   [place]'s    n   n   Biceps sheer    n      n   Speed's/Yergason's   n    n   Tenderness Biceps Groove  n    n   Noah's    n         Instability  n   Ant Apprehension   n    n   Post Apprehension   n    n   Ant Load shift    n    n   Post Load shift   n   n   Sulcus     n  n   Generalized Laxity   n  n   Relocation test   n  n   Crank test     n  n   Blane-superior escape  n       Imaging:  Xrays: 4 views of the right shoulder obtained on 8/1/2022 were independently reviewed  Indications: Right shoulder pain  Findings:  Normal glenohumeral and acromioclavicular joint spaces with a type 2 acromion. Impression: Normal-appearing right shoulder radiographs      Impression/Plan:     Myrna Browning is a 31 y.o. old male with right shoulder scapular pain. We had a discussion about possible etiologies. As noted above he had similar pain on the left side. That has since resolved but really just migrated to the contralateral side. We had a discussion about treatment options. At this point I recommended another course of physical therapy which she was amenable to. A prescription was provided. He was encouraged to keep up using his prescription anti-inflammatory as needed. I will see him back in my clinic as needed. He may return or call at anytime with persistent or worsening symptoms and with any questions or concerns.     This note is created with the assistance of a speech recognition program.  While intending to generate adocument that actually reflects the content of the visit, the document can still have some errors including those of syntax and sound a like substitutions which may escape proof reading. It such instances, actual meaningcan be extrapolated by contextual diversion.     NA = Not assessed  RTC = Rotator cuff  RCT = Rotator cuff tear  ER = External rotation  IR = Internal rotation  AC = Acromioclavicular  GH = Glenohumeral  n = No  y = Yes

## 2023-03-31 ENCOUNTER — OFFICE VISIT (OUTPATIENT)
Dept: FAMILY MEDICINE CLINIC | Age: 38
End: 2023-03-31
Payer: MEDICAID

## 2023-03-31 VITALS
OXYGEN SATURATION: 99 % | DIASTOLIC BLOOD PRESSURE: 75 MMHG | TEMPERATURE: 98.6 F | SYSTOLIC BLOOD PRESSURE: 116 MMHG | HEART RATE: 79 BPM

## 2023-03-31 DIAGNOSIS — S29.011A MUSCLE STRAIN OF CHEST WALL, INITIAL ENCOUNTER: ICD-10-CM

## 2023-03-31 DIAGNOSIS — S29.011A PECTORALIS MUSCLE STRAIN, INITIAL ENCOUNTER: Primary | ICD-10-CM

## 2023-03-31 PROCEDURE — 99213 OFFICE O/P EST LOW 20 MIN: CPT | Performed by: NURSE PRACTITIONER

## 2023-03-31 RX ORDER — IBUPROFEN 800 MG/1
800 TABLET ORAL EVERY 8 HOURS PRN
Qty: 30 TABLET | Refills: 0 | Status: SHIPPED | OUTPATIENT
Start: 2023-03-31 | End: 2023-04-10

## 2023-03-31 RX ORDER — CYCLOBENZAPRINE HCL 10 MG
10 TABLET ORAL 3 TIMES DAILY PRN
Qty: 21 TABLET | Refills: 0 | Status: SHIPPED | OUTPATIENT
Start: 2023-03-31 | End: 2023-04-10

## 2023-03-31 ASSESSMENT — ENCOUNTER SYMPTOMS
WHEEZING: 0
SHORTNESS OF BREATH: 0
CHEST TIGHTNESS: 0
COUGH: 0
VOMITING: 0

## 2023-03-31 NOTE — PROGRESS NOTES
(ADVIL;MOTRIN) 800 MG tablet Take 1 tablet by mouth 2 times daily as needed for Pain (Patient not taking: Reported on 3/31/2023) 60 tablet 1    venlafaxine (EFFEXOR XR) 150 MG extended release capsule Take 75 mg by mouth daily        No current facility-administered medications for this visit. No Known Allergies    Subjective:      Review of Systems   Respiratory:  Negative for cough, chest tightness, shortness of breath and wheezing. Cardiovascular:  Negative for chest pain. Gastrointestinal:  Negative for vomiting. Neurological:  Negative for sensory change and weakness. All other systems reviewed and are negative. 14 systems reviewed and negative except as listed in HPI. Objective:     Physical Exam  Vitals and nursing note reviewed. Constitutional:       General: He is not in acute distress. Appearance: Normal appearance. He is not ill-appearing, toxic-appearing or diaphoretic. HENT:      Head: Normocephalic and atraumatic. Right Ear: External ear normal.      Left Ear: External ear normal.   Eyes:      General: No scleral icterus. Right eye: No discharge. Left eye: No discharge. Cardiovascular:      Rate and Rhythm: Normal rate and regular rhythm. Pulses: Normal pulses. Heart sounds: Normal heart sounds. No murmur heard. No friction rub. No gallop. Pulmonary:      Effort: Pulmonary effort is normal.      Breath sounds: Normal breath sounds. Chest:      Chest wall: Tenderness present. No mass, lacerations, deformity, swelling, crepitus or edema. There is no dullness to percussion. Breasts:     Breasts are symmetrical.      Left: Normal. No tenderness. Comments: Left chest wall muscle tenderness to pectoral muscle, no bony tenderness or stepoffs. No swelling or discoloration  Tenderness fully reproducible with palpation    Musculoskeletal:         General: No swelling. Normal range of motion. Cervical back: Neck supple.    Skin:

## 2023-09-15 ENCOUNTER — OFFICE VISIT (OUTPATIENT)
Dept: FAMILY MEDICINE CLINIC | Age: 38
End: 2023-09-15

## 2023-09-15 VITALS
OXYGEN SATURATION: 98 % | SYSTOLIC BLOOD PRESSURE: 125 MMHG | TEMPERATURE: 97.5 F | DIASTOLIC BLOOD PRESSURE: 81 MMHG | HEART RATE: 83 BPM

## 2023-09-15 DIAGNOSIS — J06.9 ACUTE UPPER RESPIRATORY INFECTION: Primary | ICD-10-CM

## 2023-09-15 PROCEDURE — 99213 OFFICE O/P EST LOW 20 MIN: CPT

## 2023-09-15 RX ORDER — PREDNISONE 20 MG/1
40 TABLET ORAL DAILY
Qty: 10 TABLET | Refills: 0 | Status: SHIPPED | OUTPATIENT
Start: 2023-09-15 | End: 2023-09-20

## 2023-09-15 RX ORDER — AZITHROMYCIN 250 MG/1
250 TABLET, FILM COATED ORAL SEE ADMIN INSTRUCTIONS
Qty: 6 TABLET | Refills: 0 | Status: SHIPPED | OUTPATIENT
Start: 2023-09-15 | End: 2023-09-20

## 2023-09-15 ASSESSMENT — ENCOUNTER SYMPTOMS
DIARRHEA: 0
BLOOD IN STOOL: 0
CHEST TIGHTNESS: 0
PHOTOPHOBIA: 0
WHEEZING: 0
NAUSEA: 0
ABDOMINAL DISTENTION: 0
SORE THROAT: 0
EYES NEGATIVE: 1
ANAL BLEEDING: 0
SWOLLEN GLANDS: 0
RHINORRHEA: 1
STRIDOR: 0
SINUS PAIN: 0
COUGH: 0
HOARSE VOICE: 0
BACK PAIN: 0
FACIAL SWELLING: 0
EYE REDNESS: 0
TROUBLE SWALLOWING: 0
VOICE CHANGE: 0
SINUS PRESSURE: 0
EYE DISCHARGE: 0
APNEA: 0
EYE ITCHING: 0
GASTROINTESTINAL NEGATIVE: 1
CHOKING: 0
EYE PAIN: 0
CONSTIPATION: 0
RECTAL PAIN: 0
ABDOMINAL PAIN: 0
SHORTNESS OF BREATH: 0
VOMITING: 0
COLOR CHANGE: 0

## 2023-09-15 NOTE — PROGRESS NOTES
[address] [facility] WALK-IN FAMILY MEDICINE  [address] [facility] WALK-IN FAMILY MEDICINE  [address] [place] [address] [address] [address]  Dept: [phone]    Gretta Ramsey is a 41 y.o. male Established patient, who presents to the walk-in clinic today with conditions/complaints as noted below:    Chief Complaint   Patient presents with    Congestion     Onset 1 day          HPI:     Sinusitis  This is a new problem. The current episode started yesterday. The problem has been gradually worsening since onset. There has been no fever. The pain is mild. Pertinent negatives include no chills, congestion, coughing, diaphoresis, ear pain, headaches, hoarse voice, neck pain, shortness of breath, sinus pressure, sneezing, sore throat or swollen glands. Treatments tried: Mucinex, otc cough preparations. The treatment provided mild relief. Past Medical History:   Diagnosis Date    Anxiety     Depression     Hyperlipidemia     Hyperlipidemia 10/9/2018    Panic attacks        Current Outpatient Medications   Medication Sig Dispense Refill    predniSONE (DELTASONE) 20 MG tablet Take 2 tablets by mouth daily for 5 days 10 tablet 0    azithromycin (ZITHROMAX) 250 MG tablet Take 1 tablet by mouth See Admin Instructions for 5 days 500mg on day 1 followed by 250mg on days 2 - 5 6 tablet 0    lamoTRIgine (LAMICTAL) 100 MG tablet Take 1 tablet by mouth daily      ibuprofen (ADVIL;MOTRIN) 800 MG tablet Take 1 tablet by mouth 2 times daily as needed for Pain 60 tablet 1    venlafaxine (EFFEXOR XR) 150 MG extended release capsule Take 75 mg by mouth daily       ibuprofen (IBU) 800 MG tablet Take 1 tablet by mouth every 8 hours as needed for Pain 30 tablet 0     No current facility-administered medications for this visit.        No Known Allergies    Review of Systems:     Review of

## 2024-12-23 PROBLEM — Z00.00 ENCOUNTER FOR WELL ADULT EXAM WITHOUT ABNORMAL FINDINGS: Status: ACTIVE | Noted: 2024-12-23

## 2024-12-24 ENCOUNTER — HOSPITAL ENCOUNTER (OUTPATIENT)
Age: 39
Setting detail: SPECIMEN
Discharge: HOME | End: 2024-12-24

## 2024-12-24 DIAGNOSIS — Z11.4 SCREENING FOR HIV WITHOUT PRESENCE OF RISK FACTORS: ICD-10-CM

## 2024-12-24 DIAGNOSIS — Z11.59 NEED FOR HEPATITIS C SCREENING TEST: ICD-10-CM

## 2024-12-24 DIAGNOSIS — E78.2 MIXED HYPERLIPIDEMIA: ICD-10-CM

## 2024-12-24 LAB
ALBUMIN SERPL-MCNC: 4.6 G/DL (ref 3.5–5.2)
ALBUMIN/GLOB SERPL: 2 {RATIO} (ref 1–2.5)
ALP SERPL-CCNC: 112 U/L (ref 40–129)
ALT SERPL-CCNC: 22 U/L (ref 10–50)
ANION GAP SERPL CALCULATED.3IONS-SCNC: 10 MMOL/L (ref 9–16)
AST SERPL-CCNC: 20 U/L (ref 10–50)
BASOPHILS # BLD: 0.05 K/UL (ref 0–0.2)
BASOPHILS NFR BLD: 1 % (ref 0–2)
BILIRUB SERPL-MCNC: 0.4 MG/DL (ref 0–1.2)
BUN SERPL-MCNC: 13 MG/DL (ref 6–20)
CALCIUM SERPL-MCNC: 9.7 MG/DL (ref 8.6–10.4)
CHLORIDE SERPL-SCNC: 105 MMOL/L (ref 98–107)
CHOLEST SERPL-MCNC: 252 MG/DL (ref 0–199)
CHOLESTEROL/HDL RATIO: 8.1
CO2 SERPL-SCNC: 26 MMOL/L (ref 20–31)
CREAT SERPL-MCNC: 0.8 MG/DL (ref 0.7–1.2)
EOSINOPHIL # BLD: 0.16 K/UL (ref 0–0.44)
EOSINOPHILS RELATIVE PERCENT: 2 % (ref 1–4)
ERYTHROCYTE [DISTWIDTH] IN BLOOD BY AUTOMATED COUNT: 13 % (ref 11.8–14.4)
GFR, ESTIMATED: >90 ML/MIN/1.73M2
GLUCOSE SERPL-MCNC: 78 MG/DL (ref 74–99)
HCT VFR BLD AUTO: 47.5 % (ref 40.7–50.3)
HCV AB SERPL QL IA: NONREACTIVE
HDLC SERPL-MCNC: 31 MG/DL
HGB BLD-MCNC: 15.3 G/DL (ref 13–17)
HIV 1+2 AB+HIV1 P24 AG SERPL QL IA: NONREACTIVE
IMM GRANULOCYTES # BLD AUTO: 0.05 K/UL (ref 0–0.3)
IMM GRANULOCYTES NFR BLD: 1 %
LDLC SERPL CALC-MCNC: 150 MG/DL (ref 0–100)
LYMPHOCYTES NFR BLD: 2.84 K/UL (ref 1.1–3.7)
LYMPHOCYTES RELATIVE PERCENT: 34 % (ref 24–43)
MCH RBC QN AUTO: 29.3 PG (ref 25.2–33.5)
MCHC RBC AUTO-ENTMCNC: 32.2 G/DL (ref 28.4–34.8)
MCV RBC AUTO: 90.8 FL (ref 82.6–102.9)
MONOCYTES NFR BLD: 0.49 K/UL (ref 0.1–1.2)
MONOCYTES NFR BLD: 6 % (ref 3–12)
NEUTROPHILS NFR BLD: 56 % (ref 36–65)
NEUTS SEG NFR BLD: 4.88 K/UL (ref 1.5–8.1)
NRBC BLD-RTO: 0 PER 100 WBC
PLATELET # BLD AUTO: 271 K/UL (ref 138–453)
PMV BLD AUTO: 10.5 FL (ref 8.1–13.5)
POTASSIUM SERPL-SCNC: 4.7 MMOL/L (ref 3.7–5.3)
PROT SERPL-MCNC: 6.9 G/DL (ref 6.6–8.7)
RBC # BLD AUTO: 5.23 M/UL (ref 4.21–5.77)
SODIUM SERPL-SCNC: 141 MMOL/L (ref 136–145)
TRIGL SERPL-MCNC: 355 MG/DL (ref 0–149)
VLDLC SERPL CALC-MCNC: 71 MG/DL (ref 1–30)
WBC OTHER # BLD: 8.5 K/UL (ref 3.5–11.3)

## 2024-12-27 ENCOUNTER — HOSPITAL ENCOUNTER (EMERGENCY)
Age: 39
Discharge: HOME | End: 2024-12-27
Attending: EMERGENCY MEDICINE
Payer: COMMERCIAL

## 2024-12-27 VITALS
HEART RATE: 79 BPM | SYSTOLIC BLOOD PRESSURE: 122 MMHG | OXYGEN SATURATION: 98 % | WEIGHT: 185 LBS | RESPIRATION RATE: 15 BRPM | HEIGHT: 68 IN | DIASTOLIC BLOOD PRESSURE: 76 MMHG | BODY MASS INDEX: 28.04 KG/M2 | TEMPERATURE: 97.5 F

## 2024-12-27 DIAGNOSIS — M54.32 LEFT SIDED SCIATICA: Primary | ICD-10-CM

## 2024-12-27 PROCEDURE — 99284 EMERGENCY DEPT VISIT MOD MDM: CPT

## 2024-12-27 PROCEDURE — 6360000002 HC RX W HCPCS: Performed by: EMERGENCY MEDICINE

## 2024-12-27 RX ORDER — CYCLOBENZAPRINE HCL 10 MG
10 TABLET ORAL NIGHTLY PRN
Qty: 5 TABLET | Refills: 0 | Status: SHIPPED | OUTPATIENT
Start: 2024-12-27 | End: 2025-01-01

## 2024-12-27 RX ORDER — KETOROLAC TROMETHAMINE 30 MG/ML
60 INJECTION, SOLUTION INTRAMUSCULAR; INTRAVENOUS ONCE
Status: COMPLETED | OUTPATIENT
Start: 2024-12-27 | End: 2024-12-27

## 2024-12-27 RX ORDER — PREDNISONE 50 MG/1
50 TABLET ORAL DAILY
Qty: 5 TABLET | Refills: 0 | Status: SHIPPED | OUTPATIENT
Start: 2024-12-27 | End: 2025-01-01

## 2024-12-27 RX ADMIN — KETOROLAC TROMETHAMINE 60 MG: 30 INJECTION, SOLUTION INTRAMUSCULAR at 17:29

## 2024-12-27 ASSESSMENT — LIFESTYLE VARIABLES
HOW MANY STANDARD DRINKS CONTAINING ALCOHOL DO YOU HAVE ON A TYPICAL DAY: PATIENT DOES NOT DRINK
HOW OFTEN DO YOU HAVE A DRINK CONTAINING ALCOHOL: NEVER

## 2024-12-27 ASSESSMENT — PAIN - FUNCTIONAL ASSESSMENT: PAIN_FUNCTIONAL_ASSESSMENT: 0-10

## 2024-12-27 ASSESSMENT — ENCOUNTER SYMPTOMS
VOMITING: 0
NAUSEA: 0
DIARRHEA: 0
SHORTNESS OF BREATH: 0
CHEST TIGHTNESS: 0
EYE PAIN: 0
ABDOMINAL PAIN: 0
BACK PAIN: 1
SORE THROAT: 0
CONSTIPATION: 0
COUGH: 0

## 2024-12-27 ASSESSMENT — PAIN SCALES - GENERAL: PAINLEVEL_OUTOF10: 4

## 2024-12-27 ASSESSMENT — PAIN DESCRIPTION - LOCATION: LOCATION: BACK

## 2024-12-27 ASSESSMENT — PAIN DESCRIPTION - ORIENTATION: ORIENTATION: LEFT

## 2024-12-28 NOTE — ED PROVIDER NOTES
[facility] EMERGENCY DEPARTMENT  eMERGENCY dEPARTMENT eNCOUnter    Pt Name: Tal Walker  MRN: [id number]  Birthdate 1985  Date of evaluation: 12/27/24  CHIEF COMPLAINT       Chief Complaint   Patient presents with    Back Pain     Patient reports left sided back pain that goes down into his left leg intermitently. He reports he thinks it's his sciatica acting up. Patient denies any loss of bowel or bladder function. Reports it's difficult to sit or stand.      HISTORY OF PRESENT ILLNESS   HPI  4-5 days of left sided pain that starts near the top of his left buttocks and radiates down the back of his left leg down to his foot. No obvious inciting event or injury. No fevers. No pmh of back surgery, ivda, immunosuppression, cancer. No incontinence or saddle anesthesia.     REVIEW OF SYSTEMS     Review of Systems   Constitutional:  Negative for chills and fever.   HENT:  Negative for congestion, ear pain and sore throat.    Eyes:  Negative for pain and visual disturbance.   Respiratory:  Negative for cough, chest tightness and shortness of breath.    Cardiovascular:  Negative for chest pain and palpitations.   Gastrointestinal:  Negative for abdominal pain, constipation, diarrhea, nausea and vomiting.   Genitourinary:  Negative for dysuria and testicular pain.   Musculoskeletal:  Positive for back pain.   Skin:  Negative for rash and wound.   Neurological:  Negative for seizures, syncope and headaches.     PASTMEDICAL HISTORY     Past Medical History:   Diagnosis Date    Anxiety     Depression     Hyperlipidemia     Hyperlipidemia 10/9/2018    Panic attacks      SURGICAL HISTORY       Past Surgical History:   Procedure Laterality Date    EXPLORATION OF UNDESCENDED TESTICLE Right     TONSILLECTOMY       CURRENT MEDICATIONS       Discharge Medication List as of 12/27/2024  5:23 PM        CONTINUE these medications which have NOT CHANGED    Details   EFFEXOR XR 75 MG extended release capsule Take 1 capsule by mouth

## 2025-01-22 PROBLEM — Z00.00 ENCOUNTER FOR WELL ADULT EXAM WITHOUT ABNORMAL FINDINGS: Status: RESOLVED | Noted: 2024-12-23 | Resolved: 2025-01-22

## 2025-07-09 ENCOUNTER — OFFICE VISIT (OUTPATIENT)
Dept: FAMILY MEDICINE CLINIC | Age: 40
End: 2025-07-09
Payer: COMMERCIAL

## 2025-07-09 VITALS
SYSTOLIC BLOOD PRESSURE: 118 MMHG | TEMPERATURE: 98.9 F | DIASTOLIC BLOOD PRESSURE: 72 MMHG | OXYGEN SATURATION: 99 % | HEART RATE: 91 BPM

## 2025-07-09 DIAGNOSIS — J06.9 UPPER RESPIRATORY INFECTION WITH COUGH AND CONGESTION: Primary | ICD-10-CM

## 2025-07-09 PROCEDURE — 99213 OFFICE O/P EST LOW 20 MIN: CPT

## 2025-07-09 RX ORDER — PREDNISONE 20 MG/1
40 TABLET ORAL DAILY
Qty: 10 TABLET | Refills: 0 | Status: SHIPPED | OUTPATIENT
Start: 2025-07-09 | End: 2025-07-14

## 2025-07-09 RX ORDER — AZITHROMYCIN 250 MG/1
TABLET, FILM COATED ORAL
Qty: 6 TABLET | Refills: 0 | Status: SHIPPED | OUTPATIENT
Start: 2025-07-09 | End: 2025-07-19

## 2025-07-09 ASSESSMENT — ENCOUNTER SYMPTOMS
EYE PAIN: 0
VOMITING: 0
ABDOMINAL PAIN: 0
SWOLLEN GLANDS: 0
NAUSEA: 0
COUGH: 1
SHORTNESS OF BREATH: 0
CONSTIPATION: 0
EYE REDNESS: 0
SORE THROAT: 0
CHEST TIGHTNESS: 1
DIARRHEA: 0
EYE ITCHING: 0
VISUAL CHANGE: 0

## 2025-07-09 NOTE — PROGRESS NOTES
[facility] PHYSICIANS [facility] WALK-IN FAMILY MEDICINE  [address]  [address]  [facility] [address]  Dept: [phone]  Dept Fax: [phone]    Tal Walker is a 39 y.o. male who presents to the urgent care today for his medical conditions/complaints as notedbelow.  Tal Walker is c/o of Chest Congestion (Onset for 2 days with burning sensation in chest, cough, stuffy nose and fever. Otc cough syrup with mild relief. )      HPI:     Patient presents to the Walk In Clinic for evaluation of a productive cough with chest congestion. Patient is a current smoker, half pack a day.     Patient Care Team:  Chary Galvez APRN - CNP as PCP - General (Nurse Practitioner)  Chary Galvez APRN - CNP as PCP - Empaneled Provider      Chest Congestion  This is a new problem. The current episode started in the past 7 days. The problem occurs constantly. The problem has been gradually worsening. Associated symptoms include congestion, coughing, fatigue and a fever. Pertinent negatives include no abdominal pain, arthralgias, chest pain, chills, headaches, joint swelling, myalgias, nausea, neck pain, numbness, rash, sore throat, swollen glands, urinary symptoms, vertigo, visual change, vomiting or weakness. Treatments tried: OTC cough medicine. The treatment provided no relief.       Past Medical History:   Diagnosis Date    Anxiety     Depression     Hyperlipidemia     Hyperlipidemia 10/9/2018    Panic attacks         Current Outpatient Medications   Medication Sig Dispense Refill    azithromycin (ZITHROMAX) 250 MG tablet 500mg on day 1 followed by 250mg on days 2 - 5 6 tablet 0    predniSONE (DELTASONE) 20 MG tablet Take 2 tablets by mouth daily for 5 days 10 tablet 0    EFFEXOR XR 75 MG extended release capsule Take 1 capsule by mouth daily      ibuprofen (IBU) 800 MG tablet Take 1 tablet by mouth every 8 hours as needed for Pain 30 tablet 0    lamoTRIgine (LAMICTAL) 100 MG